# Patient Record
Sex: MALE | Race: WHITE | HISPANIC OR LATINO | URBAN - METROPOLITAN AREA
[De-identification: names, ages, dates, MRNs, and addresses within clinical notes are randomized per-mention and may not be internally consistent; named-entity substitution may affect disease eponyms.]

---

## 2022-07-30 ENCOUNTER — APPOINTMENT (OUTPATIENT)
Dept: CT IMAGING | Facility: HOSPITAL | Age: 27
End: 2022-07-30

## 2022-07-30 ENCOUNTER — APPOINTMENT (OUTPATIENT)
Dept: GENERAL RADIOLOGY | Facility: HOSPITAL | Age: 27
End: 2022-07-30

## 2022-07-30 ENCOUNTER — HOSPITAL ENCOUNTER (INPATIENT)
Facility: HOSPITAL | Age: 27
LOS: 6 days | Discharge: HOME | End: 2022-08-05
Attending: EMERGENCY MEDICINE

## 2022-07-30 DIAGNOSIS — T40.604A: Primary | ICD-10-CM

## 2022-07-30 DIAGNOSIS — J96.00 ACUTE RESPIRATORY FAILURE, UNSPECIFIED WHETHER WITH HYPOXIA OR HYPERCAPNIA: ICD-10-CM

## 2022-07-30 PROBLEM — T40.601A NARCOTIC OVERDOSE: Status: ACTIVE | Noted: 2022-07-30

## 2022-07-30 LAB
ALBUMIN SERPL-MCNC: 4.4 G/DL (ref 3.5–5.2)
ALBUMIN/GLOB SERPL: 1.7 G/DL
ALP SERPL-CCNC: 74 U/L (ref 39–117)
ALT SERPL W P-5'-P-CCNC: 18 U/L (ref 1–41)
AMPHET+METHAMPHET UR QL: POSITIVE
ANION GAP SERPL CALCULATED.3IONS-SCNC: 14 MMOL/L (ref 5–15)
APAP SERPL-MCNC: 99 MCG/ML (ref 0–30)
ARTERIAL PATENCY WRIST A: POSITIVE
AST SERPL-CCNC: 28 U/L (ref 1–40)
ATMOSPHERIC PRESS: 754.1 MMHG
BACTERIA UR QL AUTO: NORMAL /HPF
BARBITURATES UR QL SCN: NEGATIVE
BASE EXCESS BLDA CALC-SCNC: -1.6 MMOL/L (ref 0–2)
BASOPHILS # BLD AUTO: 0.03 10*3/MM3 (ref 0–0.2)
BASOPHILS NFR BLD AUTO: 0.3 % (ref 0–1.5)
BDY SITE: ABNORMAL
BENZODIAZ UR QL SCN: POSITIVE
BILIRUB SERPL-MCNC: 0.7 MG/DL (ref 0–1.2)
BILIRUB UR QL STRIP: NEGATIVE
BUN SERPL-MCNC: 14 MG/DL (ref 6–20)
BUN/CREAT SERPL: 13.7 (ref 7–25)
CALCIUM SPEC-SCNC: 8.5 MG/DL (ref 8.6–10.5)
CANNABINOIDS SERPL QL: NEGATIVE
CHLORIDE SERPL-SCNC: 103 MMOL/L (ref 98–107)
CLARITY UR: ABNORMAL
CO2 SERPL-SCNC: 20 MMOL/L (ref 22–29)
COCAINE UR QL: NEGATIVE
COLOR UR: ABNORMAL
CREAT SERPL-MCNC: 1.02 MG/DL (ref 0.76–1.27)
DEPRECATED RDW RBC AUTO: 41.5 FL (ref 37–54)
EGFRCR SERPLBLD CKD-EPI 2021: 102.7 ML/MIN/1.73
EOSINOPHIL # BLD AUTO: 0.02 10*3/MM3 (ref 0–0.4)
EOSINOPHIL NFR BLD AUTO: 0.2 % (ref 0.3–6.2)
ERYTHROCYTE [DISTWIDTH] IN BLOOD BY AUTOMATED COUNT: 13.7 % (ref 12.3–15.4)
ETHANOL BLD-MCNC: <10 MG/DL (ref 0–10)
ETHANOL UR QL: <0.01 %
GLOBULIN UR ELPH-MCNC: 2.6 GM/DL
GLUCOSE BLDC GLUCOMTR-MCNC: 190 MG/DL (ref 70–130)
GLUCOSE BLDC GLUCOMTR-MCNC: 94 MG/DL (ref 70–130)
GLUCOSE SERPL-MCNC: 158 MG/DL (ref 65–99)
GLUCOSE UR STRIP-MCNC: NEGATIVE MG/DL
HCO3 BLDA-SCNC: 24.5 MMOL/L (ref 22–28)
HCT VFR BLD AUTO: 44.1 % (ref 37.5–51)
HGB BLD-MCNC: 15.1 G/DL (ref 13–17.7)
HGB UR QL STRIP.AUTO: ABNORMAL
HYALINE CASTS UR QL AUTO: NORMAL /LPF
IMM GRANULOCYTES # BLD AUTO: 0.04 10*3/MM3 (ref 0–0.05)
IMM GRANULOCYTES NFR BLD AUTO: 0.3 % (ref 0–0.5)
INHALED O2 CONCENTRATION: 50 %
KETONES UR QL STRIP: ABNORMAL
LEUKOCYTE ESTERASE UR QL STRIP.AUTO: NEGATIVE
LYMPHOCYTES # BLD AUTO: 1.03 10*3/MM3 (ref 0.7–3.1)
LYMPHOCYTES NFR BLD AUTO: 9 % (ref 19.6–45.3)
MCH RBC QN AUTO: 28.9 PG (ref 26.6–33)
MCHC RBC AUTO-ENTMCNC: 34.2 G/DL (ref 31.5–35.7)
MCV RBC AUTO: 84.5 FL (ref 79–97)
METHADONE UR QL SCN: NEGATIVE
MODALITY: ABNORMAL
MONOCYTES # BLD AUTO: 0.66 10*3/MM3 (ref 0.1–0.9)
MONOCYTES NFR BLD AUTO: 5.8 % (ref 5–12)
NEUTROPHILS NFR BLD AUTO: 84.4 % (ref 42.7–76)
NEUTROPHILS NFR BLD AUTO: 9.66 10*3/MM3 (ref 1.7–7)
NITRITE UR QL STRIP: NEGATIVE
NRBC BLD AUTO-RTO: 0 /100 WBC (ref 0–0.2)
O2 A-A PPRESDIFF RESPIRATORY: 0.5 MMHG
OPIATES UR QL: NEGATIVE
OXYCODONE UR QL SCN: POSITIVE
PCO2 BLDA: 45 MM HG (ref 35–45)
PEEP RESPIRATORY: 5 CM[H2O]
PH BLDA: 7.34 PH UNITS (ref 7.35–7.45)
PH UR STRIP.AUTO: 6 [PH] (ref 5–8)
PLATELET # BLD AUTO: 258 10*3/MM3 (ref 140–450)
PMV BLD AUTO: 10.2 FL (ref 6–12)
PO2 BLDA: 154.4 MM HG (ref 80–100)
POTASSIUM SERPL-SCNC: 3.4 MMOL/L (ref 3.5–5.2)
PROT SERPL-MCNC: 7 G/DL (ref 6–8.5)
PROT UR QL STRIP: ABNORMAL
RBC # BLD AUTO: 5.22 10*6/MM3 (ref 4.14–5.8)
RBC # UR STRIP: NORMAL /HPF
REF LAB TEST METHOD: NORMAL
SALICYLATES SERPL-MCNC: <0.3 MG/DL
SAO2 % BLDCOA: 99.2 % (ref 92–99)
SARS-COV-2 RNA RESP QL NAA+PROBE: NOT DETECTED
SET MECH RESP RATE: 14
SODIUM SERPL-SCNC: 137 MMOL/L (ref 136–145)
SP GR UR STRIP: 1.03 (ref 1–1.03)
SQUAMOUS #/AREA URNS HPF: NORMAL /HPF
TOTAL RATE: 16 BREATHS/MINUTE
UROBILINOGEN UR QL STRIP: ABNORMAL
VENTILATOR MODE: ABNORMAL
VT ON VENT VENT: 500 ML
WBC # UR STRIP: NORMAL /HPF
WBC NRBC COR # BLD: 11.44 10*3/MM3 (ref 3.4–10.8)

## 2022-07-30 PROCEDURE — 93010 ELECTROCARDIOGRAM REPORT: CPT | Performed by: INTERNAL MEDICINE

## 2022-07-30 PROCEDURE — 80307 DRUG TEST PRSMV CHEM ANLYZR: CPT | Performed by: EMERGENCY MEDICINE

## 2022-07-30 PROCEDURE — 81001 URINALYSIS AUTO W/SCOPE: CPT | Performed by: EMERGENCY MEDICINE

## 2022-07-30 PROCEDURE — 94799 UNLISTED PULMONARY SVC/PX: CPT

## 2022-07-30 PROCEDURE — 82803 BLOOD GASES ANY COMBINATION: CPT

## 2022-07-30 PROCEDURE — 80179 DRUG ASSAY SALICYLATE: CPT | Performed by: EMERGENCY MEDICINE

## 2022-07-30 PROCEDURE — 94760 N-INVAS EAR/PLS OXIMETRY 1: CPT

## 2022-07-30 PROCEDURE — 71045 X-RAY EXAM CHEST 1 VIEW: CPT

## 2022-07-30 PROCEDURE — 93005 ELECTROCARDIOGRAM TRACING: CPT | Performed by: EMERGENCY MEDICINE

## 2022-07-30 PROCEDURE — 0 ACETYLCYSTEINE PER 100 MG: Performed by: EMERGENCY MEDICINE

## 2022-07-30 PROCEDURE — 63710000001 INSULIN REGULAR HUMAN PER 5 UNITS: Performed by: INTERNAL MEDICINE

## 2022-07-30 PROCEDURE — U0003 INFECTIOUS AGENT DETECTION BY NUCLEIC ACID (DNA OR RNA); SEVERE ACUTE RESPIRATORY SYNDROME CORONAVIRUS 2 (SARS-COV-2) (CORONAVIRUS DISEASE [COVID-19]), AMPLIFIED PROBE TECHNIQUE, MAKING USE OF HIGH THROUGHPUT TECHNOLOGIES AS DESCRIBED BY CMS-2020-01-R: HCPCS | Performed by: EMERGENCY MEDICINE

## 2022-07-30 PROCEDURE — 80143 DRUG ASSAY ACETAMINOPHEN: CPT | Performed by: EMERGENCY MEDICINE

## 2022-07-30 PROCEDURE — 94761 N-INVAS EAR/PLS OXIMETRY MLT: CPT

## 2022-07-30 PROCEDURE — 85025 COMPLETE CBC W/AUTO DIFF WBC: CPT | Performed by: EMERGENCY MEDICINE

## 2022-07-30 PROCEDURE — 31500 INSERT EMERGENCY AIRWAY: CPT

## 2022-07-30 PROCEDURE — 25010000002 MIDAZOLAM 50 MG/10ML SOLUTION: Performed by: EMERGENCY MEDICINE

## 2022-07-30 PROCEDURE — 80053 COMPREHEN METABOLIC PANEL: CPT | Performed by: EMERGENCY MEDICINE

## 2022-07-30 PROCEDURE — 99291 CRITICAL CARE FIRST HOUR: CPT

## 2022-07-30 PROCEDURE — 36600 WITHDRAWAL OF ARTERIAL BLOOD: CPT

## 2022-07-30 PROCEDURE — 25010000002 MIDAZOLAM PER 1 MG: Performed by: EMERGENCY MEDICINE

## 2022-07-30 PROCEDURE — 25010000002 SUCCINYLCHOLINE PER 20 MG: Performed by: EMERGENCY MEDICINE

## 2022-07-30 PROCEDURE — 82962 GLUCOSE BLOOD TEST: CPT

## 2022-07-30 PROCEDURE — 25010000002 PROPOFOL 10 MG/ML EMULSION: Performed by: EMERGENCY MEDICINE

## 2022-07-30 PROCEDURE — 70450 CT HEAD/BRAIN W/O DYE: CPT

## 2022-07-30 PROCEDURE — 82077 ASSAY SPEC XCP UR&BREATH IA: CPT | Performed by: EMERGENCY MEDICINE

## 2022-07-30 PROCEDURE — 94002 VENT MGMT INPAT INIT DAY: CPT

## 2022-07-30 RX ORDER — ETOMIDATE 2 MG/ML
20 INJECTION INTRAVENOUS ONCE
Status: COMPLETED | OUTPATIENT
Start: 2022-07-30 | End: 2022-07-30

## 2022-07-30 RX ORDER — SODIUM CHLORIDE 9 MG/ML
50 INJECTION, SOLUTION INTRAVENOUS CONTINUOUS
Status: DISCONTINUED | OUTPATIENT
Start: 2022-07-30 | End: 2022-08-03

## 2022-07-30 RX ORDER — FENTANYL CITRATE 50 UG/ML
50 INJECTION, SOLUTION INTRAMUSCULAR; INTRAVENOUS
Status: DISCONTINUED | OUTPATIENT
Start: 2022-07-30 | End: 2022-07-31

## 2022-07-30 RX ORDER — DEXTROSE MONOHYDRATE 25 G/50ML
25 INJECTION, SOLUTION INTRAVENOUS
Status: DISCONTINUED | OUTPATIENT
Start: 2022-07-30 | End: 2022-08-05 | Stop reason: HOSPADM

## 2022-07-30 RX ORDER — MAGNESIUM CARB/ALUMINUM HYDROX 105-160MG
296 TABLET,CHEWABLE ORAL ONCE
Status: COMPLETED | OUTPATIENT
Start: 2022-07-30 | End: 2022-07-30

## 2022-07-30 RX ORDER — SODIUM CHLORIDE 0.9 % (FLUSH) 0.9 %
10 SYRINGE (ML) INJECTION EVERY 12 HOURS SCHEDULED
Status: DISCONTINUED | OUTPATIENT
Start: 2022-07-30 | End: 2022-08-05 | Stop reason: HOSPADM

## 2022-07-30 RX ORDER — NICOTINE POLACRILEX 4 MG
15 LOZENGE BUCCAL
Status: DISCONTINUED | OUTPATIENT
Start: 2022-07-30 | End: 2022-08-05 | Stop reason: HOSPADM

## 2022-07-30 RX ORDER — POTASSIUM CHLORIDE 750 MG/1
40 TABLET, FILM COATED, EXTENDED RELEASE ORAL AS NEEDED
Status: DISCONTINUED | OUTPATIENT
Start: 2022-07-30 | End: 2022-07-31 | Stop reason: SDUPTHER

## 2022-07-30 RX ORDER — POTASSIUM CHLORIDE 1.5 G/1.77G
40 POWDER, FOR SOLUTION ORAL AS NEEDED
Status: DISCONTINUED | OUTPATIENT
Start: 2022-07-30 | End: 2022-07-31 | Stop reason: SDUPTHER

## 2022-07-30 RX ORDER — MIDAZOLAM HYDROCHLORIDE 1 MG/ML
3 INJECTION INTRAMUSCULAR; INTRAVENOUS ONCE
Status: COMPLETED | OUTPATIENT
Start: 2022-07-30 | End: 2022-07-30

## 2022-07-30 RX ORDER — SUCCINYLCHOLINE CHLORIDE 20 MG/ML
100 INJECTION INTRAMUSCULAR; INTRAVENOUS ONCE
Status: COMPLETED | OUTPATIENT
Start: 2022-07-30 | End: 2022-07-30

## 2022-07-30 RX ORDER — SODIUM CHLORIDE 0.9 % (FLUSH) 0.9 %
10 SYRINGE (ML) INJECTION AS NEEDED
Status: DISCONTINUED | OUTPATIENT
Start: 2022-07-30 | End: 2022-08-05 | Stop reason: HOSPADM

## 2022-07-30 RX ORDER — POTASSIUM CHLORIDE 7.45 MG/ML
10 INJECTION INTRAVENOUS
Status: DISCONTINUED | OUTPATIENT
Start: 2022-07-30 | End: 2022-07-31 | Stop reason: SDUPTHER

## 2022-07-30 RX ADMIN — PROPOFOL 50 MCG/KG/MIN: 10 INJECTION, EMULSION INTRAVENOUS at 20:49

## 2022-07-30 RX ADMIN — Medication 296 ML: at 18:44

## 2022-07-30 RX ADMIN — SUCCINYLCHOLINE CHLORIDE 100 MG: 20 INJECTION, SOLUTION INTRAMUSCULAR; INTRAVENOUS; PARENTERAL at 17:36

## 2022-07-30 RX ADMIN — Medication 10 ML: at 20:58

## 2022-07-30 RX ADMIN — ACETYLCYSTEINE 11920 MG: 6 INJECTION, SOLUTION INTRAVENOUS at 18:29

## 2022-07-30 RX ADMIN — MIDAZOLAM 5 MG/HR: 5 INJECTION INTRAMUSCULAR; INTRAVENOUS at 18:00

## 2022-07-30 RX ADMIN — ACETYLCYSTEINE 7940 MG: 6 INJECTION, SOLUTION INTRAVENOUS at 23:51

## 2022-07-30 RX ADMIN — ETOMIDATE 40 MG: 2 INJECTION, SOLUTION INTRAVENOUS at 17:36

## 2022-07-30 RX ADMIN — ACETYLCYSTEINE 3980 MG: 6 INJECTION, SOLUTION INTRAVENOUS at 19:33

## 2022-07-30 RX ADMIN — PROPOFOL 50 MCG/KG/MIN: 10 INJECTION, EMULSION INTRAVENOUS at 17:40

## 2022-07-30 RX ADMIN — INSULIN HUMAN 3 UNITS: 100 INJECTION, SOLUTION PARENTERAL at 20:57

## 2022-07-30 RX ADMIN — SODIUM CHLORIDE 125 ML/HR: 9 INJECTION, SOLUTION INTRAVENOUS at 20:57

## 2022-07-30 RX ADMIN — ACTIVATED CHARCOAL 50 G: 208 SUSPENSION ORAL at 18:20

## 2022-07-30 RX ADMIN — MIDAZOLAM 3 MG: 1 INJECTION INTRAMUSCULAR; INTRAVENOUS at 17:50

## 2022-07-31 ENCOUNTER — APPOINTMENT (OUTPATIENT)
Dept: CT IMAGING | Facility: HOSPITAL | Age: 27
End: 2022-07-31

## 2022-07-31 LAB
ALBUMIN SERPL-MCNC: 3.5 G/DL (ref 3.5–5.2)
ALBUMIN SERPL-MCNC: 3.7 G/DL (ref 3.5–5.2)
ALBUMIN/GLOB SERPL: 1.8 G/DL
ALP SERPL-CCNC: 63 U/L (ref 39–117)
ALP SERPL-CCNC: 66 U/L (ref 39–117)
ALT SERPL W P-5'-P-CCNC: 15 U/L (ref 1–41)
ALT SERPL W P-5'-P-CCNC: 17 U/L (ref 1–41)
ANION GAP SERPL CALCULATED.3IONS-SCNC: 12 MMOL/L (ref 5–15)
ANION GAP SERPL CALCULATED.3IONS-SCNC: 8.2 MMOL/L (ref 5–15)
APAP SERPL-MCNC: <5 MCG/ML (ref 0–30)
AST SERPL-CCNC: 17 U/L (ref 1–40)
AST SERPL-CCNC: 20 U/L (ref 1–40)
BASOPHILS # BLD AUTO: 0.03 10*3/MM3 (ref 0–0.2)
BASOPHILS NFR BLD AUTO: 0.3 % (ref 0–1.5)
BILIRUB CONJ SERPL-MCNC: <0.2 MG/DL (ref 0–0.3)
BILIRUB INDIRECT SERPL-MCNC: ABNORMAL MG/DL
BILIRUB SERPL-MCNC: 0.7 MG/DL (ref 0–1.2)
BILIRUB SERPL-MCNC: 0.8 MG/DL (ref 0–1.2)
BUN SERPL-MCNC: 10 MG/DL (ref 6–20)
BUN SERPL-MCNC: 6 MG/DL (ref 6–20)
BUN/CREAT SERPL: 14.1 (ref 7–25)
BUN/CREAT SERPL: 9.1 (ref 7–25)
CALCIUM SPEC-SCNC: 8.2 MG/DL (ref 8.6–10.5)
CALCIUM SPEC-SCNC: 8.3 MG/DL (ref 8.6–10.5)
CHLORIDE SERPL-SCNC: 106 MMOL/L (ref 98–107)
CHLORIDE SERPL-SCNC: 109 MMOL/L (ref 98–107)
CO2 SERPL-SCNC: 23.8 MMOL/L (ref 22–29)
CO2 SERPL-SCNC: 24 MMOL/L (ref 22–29)
CREAT SERPL-MCNC: 0.66 MG/DL (ref 0.76–1.27)
CREAT SERPL-MCNC: 0.71 MG/DL (ref 0.76–1.27)
DEPRECATED RDW RBC AUTO: 43.3 FL (ref 37–54)
EGFRCR SERPLBLD CKD-EPI 2021: 128.2 ML/MIN/1.73
EGFRCR SERPLBLD CKD-EPI 2021: 131 ML/MIN/1.73
EOSINOPHIL # BLD AUTO: 0.18 10*3/MM3 (ref 0–0.4)
EOSINOPHIL NFR BLD AUTO: 1.9 % (ref 0.3–6.2)
ERYTHROCYTE [DISTWIDTH] IN BLOOD BY AUTOMATED COUNT: 13.9 % (ref 12.3–15.4)
GLOBULIN UR ELPH-MCNC: 2.1 GM/DL
GLUCOSE BLDC GLUCOMTR-MCNC: 111 MG/DL (ref 70–130)
GLUCOSE BLDC GLUCOMTR-MCNC: 113 MG/DL (ref 70–130)
GLUCOSE BLDC GLUCOMTR-MCNC: 95 MG/DL (ref 70–130)
GLUCOSE SERPL-MCNC: 113 MG/DL (ref 65–99)
GLUCOSE SERPL-MCNC: 98 MG/DL (ref 65–99)
HCT VFR BLD AUTO: 46.7 % (ref 37.5–51)
HGB BLD-MCNC: 15.7 G/DL (ref 13–17.7)
IMM GRANULOCYTES # BLD AUTO: 0.02 10*3/MM3 (ref 0–0.05)
IMM GRANULOCYTES NFR BLD AUTO: 0.2 % (ref 0–0.5)
INR PPP: 1.32 (ref 0.9–1.1)
LYMPHOCYTES # BLD AUTO: 2.24 10*3/MM3 (ref 0.7–3.1)
LYMPHOCYTES NFR BLD AUTO: 23.6 % (ref 19.6–45.3)
MAGNESIUM SERPL-MCNC: 2.1 MG/DL (ref 1.6–2.6)
MCH RBC QN AUTO: 28.8 PG (ref 26.6–33)
MCHC RBC AUTO-ENTMCNC: 33.6 G/DL (ref 31.5–35.7)
MCV RBC AUTO: 85.7 FL (ref 79–97)
MONOCYTES # BLD AUTO: 0.71 10*3/MM3 (ref 0.1–0.9)
MONOCYTES NFR BLD AUTO: 7.5 % (ref 5–12)
NEUTROPHILS NFR BLD AUTO: 6.3 10*3/MM3 (ref 1.7–7)
NEUTROPHILS NFR BLD AUTO: 66.5 % (ref 42.7–76)
NRBC BLD AUTO-RTO: 0 /100 WBC (ref 0–0.2)
PLATELET # BLD AUTO: 243 10*3/MM3 (ref 140–450)
PMV BLD AUTO: 10.2 FL (ref 6–12)
POTASSIUM SERPL-SCNC: 2.8 MMOL/L (ref 3.5–5.2)
POTASSIUM SERPL-SCNC: 3.8 MMOL/L (ref 3.5–5.2)
PROT SERPL-MCNC: 5.8 G/DL (ref 6–8.5)
PROT SERPL-MCNC: 5.8 G/DL (ref 6–8.5)
PROTHROMBIN TIME: 16.2 SECONDS (ref 11.7–14.2)
RBC # BLD AUTO: 5.45 10*6/MM3 (ref 4.14–5.8)
SODIUM SERPL-SCNC: 141 MMOL/L (ref 136–145)
SODIUM SERPL-SCNC: 142 MMOL/L (ref 136–145)
WBC NRBC COR # BLD: 9.48 10*3/MM3 (ref 3.4–10.8)

## 2022-07-31 PROCEDURE — 0 POTASSIUM CHLORIDE 10 MEQ/100ML SOLUTION: Performed by: INTERNAL MEDICINE

## 2022-07-31 PROCEDURE — 82248 BILIRUBIN DIRECT: CPT | Performed by: INTERNAL MEDICINE

## 2022-07-31 PROCEDURE — 94799 UNLISTED PULMONARY SVC/PX: CPT

## 2022-07-31 PROCEDURE — 80053 COMPREHEN METABOLIC PANEL: CPT | Performed by: INTERNAL MEDICINE

## 2022-07-31 PROCEDURE — 25010000002 FENTANYL CITRATE (PF) 50 MCG/ML SOLUTION: Performed by: INTERNAL MEDICINE

## 2022-07-31 PROCEDURE — 25010000002 MIDAZOLAM 50 MG/10ML SOLUTION: Performed by: EMERGENCY MEDICINE

## 2022-07-31 PROCEDURE — 83735 ASSAY OF MAGNESIUM: CPT | Performed by: INTERNAL MEDICINE

## 2022-07-31 PROCEDURE — 94760 N-INVAS EAR/PLS OXIMETRY 1: CPT

## 2022-07-31 PROCEDURE — 25010000002 ENOXAPARIN PER 10 MG: Performed by: INTERNAL MEDICINE

## 2022-07-31 PROCEDURE — 85025 COMPLETE CBC W/AUTO DIFF WBC: CPT | Performed by: INTERNAL MEDICINE

## 2022-07-31 PROCEDURE — 25010000002 PROPOFOL 10 MG/ML EMULSION: Performed by: EMERGENCY MEDICINE

## 2022-07-31 PROCEDURE — 85610 PROTHROMBIN TIME: CPT | Performed by: INTERNAL MEDICINE

## 2022-07-31 PROCEDURE — 94003 VENT MGMT INPAT SUBQ DAY: CPT

## 2022-07-31 PROCEDURE — 94761 N-INVAS EAR/PLS OXIMETRY MLT: CPT

## 2022-07-31 PROCEDURE — 82962 GLUCOSE BLOOD TEST: CPT

## 2022-07-31 PROCEDURE — 80143 DRUG ASSAY ACETAMINOPHEN: CPT | Performed by: INTERNAL MEDICINE

## 2022-07-31 RX ORDER — MAGNESIUM SULFATE HEPTAHYDRATE 40 MG/ML
4 INJECTION, SOLUTION INTRAVENOUS AS NEEDED
Status: DISCONTINUED | OUTPATIENT
Start: 2022-07-31 | End: 2022-08-05 | Stop reason: HOSPADM

## 2022-07-31 RX ORDER — HYDROMORPHONE HYDROCHLORIDE 2 MG/1
2 TABLET ORAL EVERY 8 HOURS
Status: DISCONTINUED | OUTPATIENT
Start: 2022-07-31 | End: 2022-07-31

## 2022-07-31 RX ORDER — POTASSIUM CHLORIDE 7.45 MG/ML
10 INJECTION INTRAVENOUS
Status: DISCONTINUED | OUTPATIENT
Start: 2022-07-31 | End: 2022-08-05 | Stop reason: HOSPADM

## 2022-07-31 RX ORDER — HYDROMORPHONE HYDROCHLORIDE 2 MG/1
2 TABLET ORAL EVERY 8 HOURS
Status: DISCONTINUED | OUTPATIENT
Start: 2022-07-31 | End: 2022-08-02

## 2022-07-31 RX ORDER — MAGNESIUM SULFATE HEPTAHYDRATE 40 MG/ML
2 INJECTION, SOLUTION INTRAVENOUS AS NEEDED
Status: DISCONTINUED | OUTPATIENT
Start: 2022-07-31 | End: 2022-08-05 | Stop reason: HOSPADM

## 2022-07-31 RX ORDER — POTASSIUM CHLORIDE 750 MG/1
40 TABLET, FILM COATED, EXTENDED RELEASE ORAL AS NEEDED
Status: DISCONTINUED | OUTPATIENT
Start: 2022-07-31 | End: 2022-08-05 | Stop reason: HOSPADM

## 2022-07-31 RX ORDER — FAMOTIDINE 20 MG/1
20 TABLET, FILM COATED ORAL
Status: DISCONTINUED | OUTPATIENT
Start: 2022-07-31 | End: 2022-08-01

## 2022-07-31 RX ORDER — DIAZEPAM 5 MG/1
10 TABLET ORAL EVERY 12 HOURS
Status: DISCONTINUED | OUTPATIENT
Start: 2022-07-31 | End: 2022-07-31

## 2022-07-31 RX ORDER — ENOXAPARIN SODIUM 100 MG/ML
40 INJECTION SUBCUTANEOUS NIGHTLY
Status: DISCONTINUED | OUTPATIENT
Start: 2022-07-31 | End: 2022-08-03

## 2022-07-31 RX ORDER — FENTANYL CITRATE 50 UG/ML
100 INJECTION, SOLUTION INTRAMUSCULAR; INTRAVENOUS
Status: DISCONTINUED | OUTPATIENT
Start: 2022-07-31 | End: 2022-08-02

## 2022-07-31 RX ORDER — POTASSIUM CHLORIDE 1.5 G/1.77G
40 POWDER, FOR SOLUTION ORAL AS NEEDED
Status: DISCONTINUED | OUTPATIENT
Start: 2022-07-31 | End: 2022-08-05 | Stop reason: HOSPADM

## 2022-07-31 RX ORDER — DIAZEPAM 5 MG/1
10 TABLET ORAL EVERY 12 HOURS
Status: DISCONTINUED | OUTPATIENT
Start: 2022-07-31 | End: 2022-08-02

## 2022-07-31 RX ADMIN — MIDAZOLAM 8 MG/HR: 5 INJECTION INTRAMUSCULAR; INTRAVENOUS at 20:47

## 2022-07-31 RX ADMIN — PROPOFOL 50 MCG/KG/MIN: 10 INJECTION, EMULSION INTRAVENOUS at 09:07

## 2022-07-31 RX ADMIN — MIDAZOLAM 10 MG/HR: 5 INJECTION INTRAMUSCULAR; INTRAVENOUS at 15:16

## 2022-07-31 RX ADMIN — POTASSIUM CHLORIDE 10 MEQ: 7.46 INJECTION, SOLUTION INTRAVENOUS at 08:33

## 2022-07-31 RX ADMIN — POTASSIUM CHLORIDE 10 MEQ: 7.46 INJECTION, SOLUTION INTRAVENOUS at 09:51

## 2022-07-31 RX ADMIN — MIDAZOLAM 10 MG/HR: 5 INJECTION INTRAMUSCULAR; INTRAVENOUS at 09:12

## 2022-07-31 RX ADMIN — HYDROMORPHONE HYDROCHLORIDE 2 MG: 2 TABLET ORAL at 10:31

## 2022-07-31 RX ADMIN — Medication 10 ML: at 08:33

## 2022-07-31 RX ADMIN — PROPOFOL 20 MCG/KG/MIN: 10 INJECTION, EMULSION INTRAVENOUS at 13:35

## 2022-07-31 RX ADMIN — POTASSIUM CHLORIDE 10 MEQ: 7.46 INJECTION, SOLUTION INTRAVENOUS at 16:43

## 2022-07-31 RX ADMIN — MIDAZOLAM 3 MG/HR: 5 INJECTION INTRAMUSCULAR; INTRAVENOUS at 00:35

## 2022-07-31 RX ADMIN — PROPOFOL 50 MCG/KG/MIN: 10 INJECTION, EMULSION INTRAVENOUS at 00:56

## 2022-07-31 RX ADMIN — POTASSIUM CHLORIDE 10 MEQ: 7.46 INJECTION, SOLUTION INTRAVENOUS at 07:12

## 2022-07-31 RX ADMIN — PROPOFOL 50 MCG/KG/MIN: 10 INJECTION, EMULSION INTRAVENOUS at 04:56

## 2022-07-31 RX ADMIN — SODIUM CHLORIDE 125 ML/HR: 9 INJECTION, SOLUTION INTRAVENOUS at 04:58

## 2022-07-31 RX ADMIN — FAMOTIDINE 20 MG: 20 TABLET ORAL at 18:21

## 2022-07-31 RX ADMIN — FENTANYL CITRATE 100 MCG: 50 INJECTION INTRAMUSCULAR; INTRAVENOUS at 08:57

## 2022-07-31 RX ADMIN — DIAZEPAM 10 MG: 5 TABLET ORAL at 10:31

## 2022-07-31 RX ADMIN — ENOXAPARIN SODIUM 40 MG: 100 INJECTION SUBCUTANEOUS at 21:01

## 2022-07-31 RX ADMIN — FENTANYL CITRATE 100 MCG: 50 INJECTION INTRAMUSCULAR; INTRAVENOUS at 22:11

## 2022-07-31 RX ADMIN — FENTANYL CITRATE 100 MCG: 50 INJECTION INTRAMUSCULAR; INTRAVENOUS at 15:58

## 2022-07-31 RX ADMIN — PROPOFOL 20 MCG/KG/MIN: 10 INJECTION, EMULSION INTRAVENOUS at 21:01

## 2022-07-31 RX ADMIN — DIAZEPAM 10 MG: 5 TABLET ORAL at 21:44

## 2022-07-31 RX ADMIN — POTASSIUM CHLORIDE 10 MEQ: 7.46 INJECTION, SOLUTION INTRAVENOUS at 12:04

## 2022-07-31 RX ADMIN — HYDROMORPHONE HYDROCHLORIDE 2 MG: 2 TABLET ORAL at 18:21

## 2022-07-31 RX ADMIN — SODIUM CHLORIDE 125 ML/HR: 9 INJECTION, SOLUTION INTRAVENOUS at 20:46

## 2022-07-31 RX ADMIN — POTASSIUM CHLORIDE 10 MEQ: 7.46 INJECTION, SOLUTION INTRAVENOUS at 15:20

## 2022-08-01 ENCOUNTER — APPOINTMENT (OUTPATIENT)
Dept: GENERAL RADIOLOGY | Facility: HOSPITAL | Age: 27
End: 2022-08-01

## 2022-08-01 LAB
ALBUMIN SERPL-MCNC: 3.5 G/DL (ref 3.5–5.2)
ALP SERPL-CCNC: 67 U/L (ref 39–117)
ALT SERPL W P-5'-P-CCNC: 15 U/L (ref 1–41)
ANION GAP SERPL CALCULATED.3IONS-SCNC: 10 MMOL/L (ref 5–15)
AST SERPL-CCNC: 16 U/L (ref 1–40)
BASOPHILS # BLD AUTO: 0.04 10*3/MM3 (ref 0–0.2)
BASOPHILS NFR BLD AUTO: 0.4 % (ref 0–1.5)
BILIRUB CONJ SERPL-MCNC: <0.2 MG/DL (ref 0–0.3)
BILIRUB INDIRECT SERPL-MCNC: NORMAL MG/DL
BILIRUB SERPL-MCNC: 0.6 MG/DL (ref 0–1.2)
BUN SERPL-MCNC: 5 MG/DL (ref 6–20)
BUN/CREAT SERPL: 7.6 (ref 7–25)
CALCIUM SPEC-SCNC: 8.3 MG/DL (ref 8.6–10.5)
CHLORIDE SERPL-SCNC: 107 MMOL/L (ref 98–107)
CO2 SERPL-SCNC: 23 MMOL/L (ref 22–29)
CREAT SERPL-MCNC: 0.66 MG/DL (ref 0.76–1.27)
DEPRECATED RDW RBC AUTO: 41.8 FL (ref 37–54)
EGFRCR SERPLBLD CKD-EPI 2021: 131 ML/MIN/1.73
EOSINOPHIL # BLD AUTO: 0.25 10*3/MM3 (ref 0–0.4)
EOSINOPHIL NFR BLD AUTO: 2.7 % (ref 0.3–6.2)
ERYTHROCYTE [DISTWIDTH] IN BLOOD BY AUTOMATED COUNT: 13.6 % (ref 12.3–15.4)
GLUCOSE BLDC GLUCOMTR-MCNC: 86 MG/DL (ref 70–130)
GLUCOSE BLDC GLUCOMTR-MCNC: 89 MG/DL (ref 70–130)
GLUCOSE BLDC GLUCOMTR-MCNC: 97 MG/DL (ref 70–130)
GLUCOSE SERPL-MCNC: 98 MG/DL (ref 65–99)
HCT VFR BLD AUTO: 43.7 % (ref 37.5–51)
HGB BLD-MCNC: 14.8 G/DL (ref 13–17.7)
IMM GRANULOCYTES # BLD AUTO: 0.03 10*3/MM3 (ref 0–0.05)
IMM GRANULOCYTES NFR BLD AUTO: 0.3 % (ref 0–0.5)
LYMPHOCYTES # BLD AUTO: 1.53 10*3/MM3 (ref 0.7–3.1)
LYMPHOCYTES NFR BLD AUTO: 16.3 % (ref 19.6–45.3)
MAGNESIUM SERPL-MCNC: 1.9 MG/DL (ref 1.6–2.6)
MCH RBC QN AUTO: 28.5 PG (ref 26.6–33)
MCHC RBC AUTO-ENTMCNC: 33.9 G/DL (ref 31.5–35.7)
MCV RBC AUTO: 84 FL (ref 79–97)
MONOCYTES # BLD AUTO: 0.6 10*3/MM3 (ref 0.1–0.9)
MONOCYTES NFR BLD AUTO: 6.4 % (ref 5–12)
NEUTROPHILS NFR BLD AUTO: 6.91 10*3/MM3 (ref 1.7–7)
NEUTROPHILS NFR BLD AUTO: 73.9 % (ref 42.7–76)
NRBC BLD AUTO-RTO: 0 /100 WBC (ref 0–0.2)
PLATELET # BLD AUTO: 227 10*3/MM3 (ref 140–450)
PMV BLD AUTO: 10.4 FL (ref 6–12)
POTASSIUM SERPL-SCNC: 3.4 MMOL/L (ref 3.5–5.2)
POTASSIUM SERPL-SCNC: 4.1 MMOL/L (ref 3.5–5.2)
PROT SERPL-MCNC: 6.1 G/DL (ref 6–8.5)
QT INTERVAL: 351 MS
RBC # BLD AUTO: 5.2 10*6/MM3 (ref 4.14–5.8)
SODIUM SERPL-SCNC: 140 MMOL/L (ref 136–145)
WBC NRBC COR # BLD: 9.36 10*3/MM3 (ref 3.4–10.8)

## 2022-08-01 PROCEDURE — 94799 UNLISTED PULMONARY SVC/PX: CPT

## 2022-08-01 PROCEDURE — 0 POTASSIUM CHLORIDE 10 MEQ/100ML SOLUTION: Performed by: INTERNAL MEDICINE

## 2022-08-01 PROCEDURE — 25010000002 FENTANYL CITRATE (PF) 50 MCG/ML SOLUTION: Performed by: INTERNAL MEDICINE

## 2022-08-01 PROCEDURE — 94003 VENT MGMT INPAT SUBQ DAY: CPT

## 2022-08-01 PROCEDURE — 25010000002 MIDAZOLAM 50 MG/10ML SOLUTION: Performed by: EMERGENCY MEDICINE

## 2022-08-01 PROCEDURE — 82962 GLUCOSE BLOOD TEST: CPT

## 2022-08-01 PROCEDURE — 74018 RADEX ABDOMEN 1 VIEW: CPT

## 2022-08-01 PROCEDURE — 85025 COMPLETE CBC W/AUTO DIFF WBC: CPT | Performed by: INTERNAL MEDICINE

## 2022-08-01 PROCEDURE — 25010000002 PROPOFOL 10 MG/ML EMULSION: Performed by: EMERGENCY MEDICINE

## 2022-08-01 PROCEDURE — 80048 BASIC METABOLIC PNL TOTAL CA: CPT | Performed by: INTERNAL MEDICINE

## 2022-08-01 PROCEDURE — 25010000002 MIDAZOLAM 50 MG/10ML SOLUTION: Performed by: INTERNAL MEDICINE

## 2022-08-01 PROCEDURE — 83735 ASSAY OF MAGNESIUM: CPT | Performed by: INTERNAL MEDICINE

## 2022-08-01 PROCEDURE — 94760 N-INVAS EAR/PLS OXIMETRY 1: CPT

## 2022-08-01 PROCEDURE — 25010000002 ENOXAPARIN PER 10 MG: Performed by: INTERNAL MEDICINE

## 2022-08-01 PROCEDURE — 84132 ASSAY OF SERUM POTASSIUM: CPT | Performed by: INTERNAL MEDICINE

## 2022-08-01 PROCEDURE — 80076 HEPATIC FUNCTION PANEL: CPT | Performed by: INTERNAL MEDICINE

## 2022-08-01 PROCEDURE — 94761 N-INVAS EAR/PLS OXIMETRY MLT: CPT

## 2022-08-01 RX ORDER — HYDRALAZINE HYDROCHLORIDE 20 MG/ML
20 INJECTION INTRAMUSCULAR; INTRAVENOUS EVERY 6 HOURS PRN
Status: DISCONTINUED | OUTPATIENT
Start: 2022-08-01 | End: 2022-08-04

## 2022-08-01 RX ORDER — FAMOTIDINE 20 MG/1
20 TABLET, FILM COATED ORAL
Status: DISCONTINUED | OUTPATIENT
Start: 2022-08-01 | End: 2022-08-03

## 2022-08-01 RX ADMIN — PROPOFOL 10 MCG/KG/MIN: 10 INJECTION, EMULSION INTRAVENOUS at 22:28

## 2022-08-01 RX ADMIN — SODIUM CHLORIDE 125 ML/HR: 9 INJECTION, SOLUTION INTRAVENOUS at 03:48

## 2022-08-01 RX ADMIN — POTASSIUM CHLORIDE 10 MEQ: 7.46 INJECTION, SOLUTION INTRAVENOUS at 07:04

## 2022-08-01 RX ADMIN — FENTANYL CITRATE 100 MCG: 50 INJECTION INTRAMUSCULAR; INTRAVENOUS at 05:36

## 2022-08-01 RX ADMIN — FENTANYL CITRATE 100 MCG: 50 INJECTION INTRAMUSCULAR; INTRAVENOUS at 08:50

## 2022-08-01 RX ADMIN — SODIUM CHLORIDE 125 ML/HR: 9 INJECTION, SOLUTION INTRAVENOUS at 17:04

## 2022-08-01 RX ADMIN — MIDAZOLAM 9 MG/HR: 5 INJECTION INTRAMUSCULAR; INTRAVENOUS at 08:36

## 2022-08-01 RX ADMIN — MIDAZOLAM 9 MG/HR: 5 INJECTION INTRAMUSCULAR; INTRAVENOUS at 02:25

## 2022-08-01 RX ADMIN — FENTANYL CITRATE 100 MCG: 50 INJECTION INTRAMUSCULAR; INTRAVENOUS at 19:08

## 2022-08-01 RX ADMIN — POTASSIUM CHLORIDE 10 MEQ: 7.46 INJECTION, SOLUTION INTRAVENOUS at 08:27

## 2022-08-01 RX ADMIN — FENTANYL CITRATE 100 MCG: 50 INJECTION INTRAMUSCULAR; INTRAVENOUS at 14:08

## 2022-08-01 RX ADMIN — FAMOTIDINE 20 MG: 20 TABLET ORAL at 17:19

## 2022-08-01 RX ADMIN — HYDROMORPHONE HYDROCHLORIDE 2 MG: 2 TABLET ORAL at 02:31

## 2022-08-01 RX ADMIN — Medication 10 ML: at 08:58

## 2022-08-01 RX ADMIN — POTASSIUM CHLORIDE 10 MEQ: 7.46 INJECTION, SOLUTION INTRAVENOUS at 05:37

## 2022-08-01 RX ADMIN — Medication 10 ML: at 21:19

## 2022-08-01 RX ADMIN — HYDROMORPHONE HYDROCHLORIDE 2 MG: 2 TABLET ORAL at 09:56

## 2022-08-01 RX ADMIN — MIDAZOLAM 5 MG/HR: 5 INJECTION INTRAMUSCULAR; INTRAVENOUS at 17:08

## 2022-08-01 RX ADMIN — PROPOFOL 20 MCG/KG/MIN: 10 INJECTION, EMULSION INTRAVENOUS at 07:06

## 2022-08-01 RX ADMIN — POTASSIUM CHLORIDE 10 MEQ: 7.46 INJECTION, SOLUTION INTRAVENOUS at 12:45

## 2022-08-01 RX ADMIN — HYDROMORPHONE HYDROCHLORIDE 2 MG: 2 TABLET ORAL at 18:33

## 2022-08-01 RX ADMIN — DIAZEPAM 10 MG: 5 TABLET ORAL at 21:30

## 2022-08-01 RX ADMIN — DIAZEPAM 10 MG: 5 TABLET ORAL at 09:56

## 2022-08-01 RX ADMIN — FAMOTIDINE 20 MG: 20 TABLET ORAL at 06:37

## 2022-08-01 RX ADMIN — ENOXAPARIN SODIUM 40 MG: 100 INJECTION SUBCUTANEOUS at 21:19

## 2022-08-01 RX ADMIN — FENTANYL CITRATE 100 MCG: 50 INJECTION INTRAMUSCULAR; INTRAVENOUS at 16:22

## 2022-08-01 NOTE — NURSING NOTE
Spoke with Matilde at [facility]. She stated that we will await morning's lab results to decide if another dose of acetylcysteine is needed.

## 2022-08-01 NOTE — PROGRESS NOTES
Nutrition Services    Patient Name:  Juventino Reyes Moreno  YOB: 1995  MRN: [id number]  Admit Date:  7/30/2022    Comment:  Nutrition Consult for TF assessment. Pt s/p Polysubstance abuse, acute respiratory failure.  Propofol 4.76  Recommend IsoSource 1.5 with goal at 55ml/hr and water [id number].   Will continue to follow for clinical course.     CLINICAL NUTRITION ASSESSMENT      Reason for Assessment Physician Consult, Tube Feeding Assessment      H&P      No past medical history on file.    No past surgical history on file.     Current Problems     27-year-old male presented to the emergency room with an overdose. Acute respiratory failure on vent.     Nutritional Risk Screening       MST SCORE 0   Risk Screening Criteria      Encounter Information        Nutrition/Diet History:    n/a   Food Preferences:   n/a   Supplements:   n/a   Typical Activity Pattern:      Factors Affecting Intake: altered mental status, altered respiratory status     Tests/Procedures: CT scan       Anthropometrics        Current Height  Current Weight  BMI kg/m2    Weight: 67.9 kg (149 lb 11.1 oz) (08/01/22 0400)  There is no height or weight on file to calculate BMI.       Admission Weight    Ideal Body Weight (IBW)    Usual Body Weight (UBW)        Trending Weight Hx     Weight Change              PTA: Wt Readings from Last 30 Encounters:   08/01/22 0400 67.9 kg (149 lb 11.1 oz)   07/31/22 0800 77.4 kg (170 lb 10.2 oz)   07/30/22 2045 77.4 kg (170 lb 10.2 oz)   07/30/22 1733 79.4 kg (175 lb)            Labs       Pertinent Labs    Results from last 7 days   Lab Units 08/01/22  0337 07/31/22  1636 07/31/22  1635 07/31/22  0318   SODIUM mmol/L 140  --  141 142   POTASSIUM mmol/L 3.4*  --  3.8 2.8*   CHLORIDE mmol/L 107  --  109* 106   CO2 mmol/L 23.0  --  23.8 24.0   BUN mg/dL 5*  --  6 10   CREATININE mg/dL 0.66*  --  0.66* 0.71*   CALCIUM mg/dL 8.3*  --  8.3* 8.2*   BILIRUBIN mg/dL 0.6 0.8 0.7  --    ALK PHOS U/L 67 63  66  --    ALT (SGPT) U/L 15 17 15  --    AST (SGOT) U/L 16 17 20  --    GLUCOSE mg/dL 98  --  113* 98     Results from last 7 days   Lab Units 08/01/22  0337 07/31/22  0318   MAGNESIUM mg/dL 1.9 2.1   HEMOGLOBIN g/dL 14.8 15.7   HEMATOCRIT % 43.7 46.7   WBC 10*3/mm3 9.36 9.48     Results from last 7 days   Lab Units 08/01/22  0337 07/31/22  1636 07/31/22  0318 07/30/22  1755   INR   --  1.32*  --   --    PLATELETS 10*3/mm3 227  --  243 258     COVID19   Date Value Ref Range Status   07/30/2022 Not Detected Not Detected - Ref. Range Final     No results found for: HGBA1C       Medications           Scheduled Medications diazePAM, 10 mg, Nasogastric, Q12H  enoxaparin, 40 mg, Subcutaneous, Nightly  famotidine, 20 mg, Nasogastric, BID AC  HYDROmorphone, 2 mg, Nasogastric, Q8H  insulin regular, 0-14 Units, Subcutaneous, Q6H  sodium chloride, 10 mL, Intravenous, Q12H       Infusions midazolam, 1-5 mg/hr, Last Rate: 9 mg/hr (08/01/22 0836)  propofol, 5-50 mcg/kg/min, Last Rate: 10 mcg/kg/min (08/01/22 1108)  sodium chloride, 125 mL/hr, Last Rate: 125 mL/hr (08/01/22 0348)       PRN Medications •  dextrose  •  dextrose  •  fentaNYL citrate (PF)  •  glucagon (human recombinant)  •  hydrALAZINE  •  magnesium sulfate **OR** magnesium sulfate **OR** magnesium sulfate  •  potassium & sodium phosphates **OR** potassium & sodium phosphates  •  potassium chloride **OR** potassium chloride **OR** potassium chloride  •  [COMPLETED] Insert peripheral IV **AND** sodium chloride  •  sodium chloride     Physical Findings        Physical Appearance ventilator support     NFPE Not applicable   --  Edema  no edema   Gastrointestinal non-distended    Tubes/Drains NG tube   Oral/Mouth Cavity missing teeth   Skin skin intact     Estimated/Assessed Needs       Energy Requirements    Height for Calculation      Weight for Calculation 67.9 kg   Method for Estimation  30 kcal/kg   EST Needs (kcal/day) 2037       Protein Requirements    Weight for  Calculation 67.9 kg   EST Protein Needs (g/kg) 1.2 gm/kg   EST Daily Needs (g/day) 81       Fluid Requirements     Estimated Needs (mL/day)        Fluid Deficit    Current Na Level (mEq/L)    Desired Na Level (mEq/L)    Estimated Fluid Deficit (L)     --  Current Nutrition Orders & Evaluation of Intake       Oral Nutrition     Food Allergies NKFA   Current PO Diet NPO Diet NPO Type: Strict NPO   Supplement n/a   PO Evaluation     Trending % PO Intake        --  Nutrition Diagnosis        Nutrition Dx Problem 1 Problem: Needs Alternative Route    Etiology: Medical Diagnosis    Signs/Symptoms: NPO    Comment:            Intervention Goal        Intervention Goal(s) Maintain nutrition status, Reduce/improve symptoms, Meet estimated needs, Disease management/therapy, Initiate TF/PN and Tolerate TF/PN at goal     Nutrition Intervention        RD Action Follow Tx Progress, Care plan reviewed and Recommend/ordered:      Nutrition Prescription        Diet Prescription npo   Supplement Prescription n/a   Prescription Ordered    -   Enteral Prescription:     Enteral Route NG    TF Delivery Method Continuous    Enteral Product Isosource 1.5    Modular     Propofol Rate/Kcal     TF Start Rate 20ml/hr    TF Goal Rate 55ml/hr    Free Water Flush     TF Provision at Goal:          Calories 1980(97% needs met)         Protein (gm) 89 (100% needs met)         Fluid (mL) 1003    Prescription Ordered Yes                                                               -  Monitor/Evaluation        Monitor Per protocol     RD to follow per protocol.      Electronically signed by:  Luz Morales RD  08/01/22 12:28 EDT

## 2022-08-01 NOTE — PLAN OF CARE
Problem: Restraint, Nonbehavioral (Nonviolent)  Goal: Absence of Harm or Injury  Intervention: Implement Least Restrictive Safety Strategies  Recent Flowsheet Documentation  Taken 8/1/2022 0600 by Radha Lewis RN  Medical Device Protection:   IV pole/bag removed from visual field   torso covered   tubing secured  Less Restrictive Alternative:   bed alarm in use   sensory stimulation provided   safety enhancements provided  De-Escalation Techniques:   appropriate behavior reinforced   increased round frequency   medication administered   quiet time facilitated   reoriented   stimulation decreased   time out facilitated  Diversional Activities: television  Taken 8/1/2022 0500 by Radha Lewis RN  Medical Device Protection:   IV pole/bag removed from visual field   torso covered   tubing secured  Taken 8/1/2022 0400 by Radha Lewis RN  Medical Device Protection:   IV pole/bag removed from visual field   torso covered   tubing secured  Less Restrictive Alternative:   bed alarm in use   sensory stimulation provided   safety enhancements provided  De-Escalation Techniques:   increased round frequency   medication administered   quiet time facilitated   reoriented   stimulation decreased  Diversional Activities: television  Taken 8/1/2022 0300 by Radha Lewis RN  Medical Device Protection:   IV pole/bag removed from visual field   torso covered   tubing secured  Taken 8/1/2022 0200 by Radha Lewis RN  Medical Device Protection:   IV pole/bag removed from visual field   tubing secured   torso covered  Less Restrictive Alternative:   bed alarm in use   sensory stimulation provided   safety enhancements provided  De-Escalation Techniques:   increased round frequency   medication administered   quiet time facilitated   stimulation decreased   reoriented  Diversional Activities: television  Taken 8/1/2022 0100 by Radha Lewis RN  Medical Device Protection:   IV pole/bag removed from visual field   torso covered    tubing secured  Taken 8/1/2022 0000 by Radha Lewis RN  Medical Device Protection:   IV pole/bag removed from visual field   torso covered   tubing secured  Less Restrictive Alternative:   bed alarm in use   sensory stimulation provided   safety enhancements provided  De-Escalation Techniques:   increased round frequency   medication administered   quiet time facilitated   stimulation decreased   reoriented  Diversional Activities: television  Taken 7/31/2022 2300 by Radha Lewis RN  Medical Device Protection:   IV pole/bag removed from visual field   torso covered   tubing secured  Taken 7/31/2022 2200 by Radha Lewis RN  Medical Device Protection:   IV pole/bag removed from visual field   tubing secured   torso covered  Less Restrictive Alternative:   bed alarm in use   sensory stimulation provided   safety enhancements provided  De-Escalation Techniques:   increased round frequency   medication administered   quiet time facilitated   reoriented   stimulation decreased  Diversional Activities: television  Taken 7/31/2022 2100 by Radha Lewis RN  Medical Device Protection:   IV pole/bag removed from visual field   torso covered   tubing secured  Taken 7/31/2022 2000 by Radha Lewis RN  Medical Device Protection:   IV pole/bag removed from visual field   torso covered   tubing secured  Less Restrictive Alternative:   bed alarm in use   sensory stimulation provided   safety enhancements provided  De-Escalation Techniques:   appropriate behavior reinforced   increased round frequency   medication administered   quiet time facilitated   reoriented   stimulation decreased   time out facilitated   verbally redirected  Diversional Activities: television   Goal Outcome Evaluation:            No acute events overnight. Patient able to move all extremities, but has no purposeful movement. Discussed plan with family. Decision on who will be POA should be made today. Sedation titrated. K+ replaced. BP elevated, order  for PRN Hydralazine received. UO stable. Restraints checked per protocol. Will continue to monitor.

## 2022-08-01 NOTE — PROGRESS NOTES
Dr. SUNITA Abreu    [facility] INTENSIVE CARE    8/1/2022    Patient ID:  Name:  Juventino Reyes Moreno  MRN:  [id number]  1995  27 y.o.  male            CC/Reason for visit: Polysubstance abuse, acute respiratory failure     Interval hx: Patient intubated, mechanically ventilated.  Sedated, not responding to any commands but he does focalized pain and flails around, restless when stimulated tactile.  I reviewed mechanical ventilator settings     ROS: Unobtainable.    Vitals:  Vitals:    08/01/22 0744 08/01/22 0800 08/01/22 0830 08/01/22 0900   BP:  169/89 154/92 127/79   BP Location:       Patient Position:       Pulse: 109 109 102 108   Resp: 18      Temp: 99.86 °F (37.7 °C) 99.9 °F (37.7 °C) 99.9 °F (37.7 °C) 100 °F (37.8 °C)   TempSrc:       SpO2: 97% 97% 97% 96%   Weight:         FiO2 (%): 25 %     There is no height or weight on file to calculate BMI.    Intake/Output Summary (Last 24 hours) at 8/1/2022 1113  Last data filed at 8/1/2022 0600  Gross per 24 hour   Intake 3538 ml   Output 2325 ml   Net 1213 ml       Exam:  GEN:  Intubated  Mechanically ventilated  Sedated  To painful sternal rub he will flail around and grimace but does not open eyes, does not follow commands  LUNGS: Clear breath sounds bilat, no use of accessory muscles  CV:  Normal S1S2, without murmur, no edema  ABD:  Non tender, no enlarged liver or masses      Scheduled meds:  diazePAM, 10 mg, Nasogastric, Q12H  enoxaparin, 40 mg, Subcutaneous, Nightly  famotidine, 20 mg, Oral, BID AC  HYDROmorphone, 2 mg, Nasogastric, Q8H  insulin regular, 0-14 Units, Subcutaneous, Q6H  sodium chloride, 10 mL, Intravenous, Q12H      IV meds:                      midazolam, 1-10 mg/hr, Last Rate: 9 mg/hr (08/01/22 0836)  propofol, 5-50 mcg/kg/min, Last Rate: 10 mcg/kg/min (08/01/22 1108)  sodium chloride, 125 mL/hr, Last Rate: 125 mL/hr (08/01/22 7812)        Data Review:   I reviewed the patient's medications and new clinical  results.    COVID19   Date Value Ref Range Status   07/30/2022 Not Detected Not Detected - Ref. Range Final         Lab Results   Component Value Date    CALCIUM 8.3 (L) 08/01/2022    MG 1.9 08/01/2022    MG 2.1 07/31/2022     Results from last 7 days   Lab Units 08/01/22  0337 07/31/22  1636 07/31/22  1635 07/31/22  0318 07/30/22  1755   SODIUM mmol/L 140  --  141 142 137   POTASSIUM mmol/L 3.4*  --  3.8 2.8* 3.4*   CHLORIDE mmol/L 107  --  109* 106 103   CO2 mmol/L 23.0  --  23.8 24.0 20.0*   BUN mg/dL 5*  --  6 10 14   CREATININE mg/dL 0.66*  --  0.66* 0.71* 1.02   CALCIUM mg/dL 8.3*  --  8.3* 8.2* 8.5*   BILIRUBIN mg/dL 0.6 0.8 0.7  --  0.7   ALK PHOS U/L 67 63 66  --  74   ALT (SGPT) U/L 15 17 15  --  18   AST (SGOT) U/L 16 17 20  --  28   GLUCOSE mg/dL 98  --  113* 98 158*   WBC 10*3/mm3 9.36  --   --  9.48 11.44*   HEMOGLOBIN g/dL 14.8  --   --  15.7 15.1   PLATELETS 10*3/mm3 227  --   --  243 258   INR   --  1.32*  --   --   --                  Results from last 7 days   Lab Units 07/30/22  1914   PH, ARTERIAL pH units 7.343*   PCO2, ARTERIAL mm Hg 45.0   PO2 ART mm Hg 154.4*   MODALITY  Adult Vent   O2 SATURATION CALC % 99.2*       CrCl cannot be calculated (Unknown ideal weight.).      ASSESSMENT:   Narcotic overdose (HCC)  Polysubstance abuse  Need for mechanical ventilation, inability to protect airway due to drug overdose  Acute toxic encephalopathy, agitation  Acetaminophen overdose  Leukocytosis  Low potassium      PLAN:  Patient remains quite encephalopathic when we try sedation vacation.  For now we are continuing with Valium and Dilaudid by mouth and try to wean his sedative drips.  He is still requiring propofol and Versed drip.  Cannot try spontaneous breathing trial while the patient is neurologically inappropriate.  Continue stress ulcer prophylaxis and Lovenox for DVT prophylaxis.    Critical care time 34 minutes, excluding any separately billable procedure times.      Bandar Abreu,  MD  8/1/2022

## 2022-08-01 NOTE — NURSING NOTE
Spoke with Mecca, family member. She stated that she is the only family member in the [place] that speaks english. She is to be notified of changes and then she will relay the information to the family. She also stated that patients siblings will get on a conference call to mother to decide a POA and will let staff know tomorrow.    Mecca's phone number is [phone]
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
## 2022-08-01 NOTE — PROGRESS NOTES
Discharge Planning Assessment  [facility]     Patient Name: Juventino Reyes Moreno  MRN: [id number]  Today's Date: 8/1/2022    Admit Date: 7/30/2022     Discharge Needs Assessment     Row Name 08/01/22 1505       Living Environment    People in Home other relative(s);sibling(s)    Name(s) of People in Home sister and her  Ronald    Current Living Arrangements apartment    Primary Care Provided by self    Provides Primary Care For no one    Family Caregiver if Needed none    Quality of Family Relationships supportive       Resource/Environmental Concerns    Transportation Concerns none       Transition Planning    Patient/Family Anticipates Transition to home with family    Patient/Family Anticipated Services at Transition none    Transportation Anticipated family or friend will provide       Discharge Needs Assessment    Equipment Currently Used at Home none    Concerns to be Addressed discharge planning    Anticipated Changes Related to Illness none    Equipment Needed After Discharge none    Provided Post Acute Provider List? N/A               Discharge Plan     Row Name 08/01/22 1506       Plan    Plan Plan is undetermined    Plan Comments IMM noted.  CCP spoke to patient’s family  at bedside. Face sheet verified.  CCP role explained and discharge planning discussed.  His brother in law [facility] [phone]  is his emergency contact.   Pt  does not have a PCP.   Pt lives in an apartment with his sister and brother in law.  He uses no DME to ambulate.   He is independent with ADL’s.   He has no history of HH.  He has a no rehab stays. Plan is undetermined  Access to evaluate  CCP following              Continued Care and Services - Admitted Since 7/30/2022    Coordination has not been started for this encounter.          Demographic Summary    No documentation.                Functional Status    No documentation.                Psychosocial    No documentation.                Abuse/Neglect    No  documentation.                Legal    No documentation.                Substance Abuse    No documentation.                Patient Forms    No documentation.                   Pia Sotelo RN

## 2022-08-02 LAB
ANION GAP SERPL CALCULATED.3IONS-SCNC: 7 MMOL/L (ref 5–15)
BASOPHILS # BLD AUTO: 0.02 10*3/MM3 (ref 0–0.2)
BASOPHILS NFR BLD AUTO: 0.3 % (ref 0–1.5)
BUN SERPL-MCNC: 7 MG/DL (ref 6–20)
BUN/CREAT SERPL: 11.9 (ref 7–25)
CALCIUM SPEC-SCNC: 8.3 MG/DL (ref 8.6–10.5)
CHLORIDE SERPL-SCNC: 103 MMOL/L (ref 98–107)
CO2 SERPL-SCNC: 26 MMOL/L (ref 22–29)
CREAT SERPL-MCNC: 0.59 MG/DL (ref 0.76–1.27)
DEPRECATED RDW RBC AUTO: 41.7 FL (ref 37–54)
EGFRCR SERPLBLD CKD-EPI 2021: 135.5 ML/MIN/1.73
EOSINOPHIL # BLD AUTO: 0.3 10*3/MM3 (ref 0–0.4)
EOSINOPHIL NFR BLD AUTO: 4 % (ref 0.3–6.2)
ERYTHROCYTE [DISTWIDTH] IN BLOOD BY AUTOMATED COUNT: 13.4 % (ref 12.3–15.4)
GLUCOSE BLDC GLUCOMTR-MCNC: 111 MG/DL (ref 70–130)
GLUCOSE BLDC GLUCOMTR-MCNC: 151 MG/DL (ref 70–130)
GLUCOSE BLDC GLUCOMTR-MCNC: 168 MG/DL (ref 70–130)
GLUCOSE BLDC GLUCOMTR-MCNC: 97 MG/DL (ref 70–130)
GLUCOSE SERPL-MCNC: 116 MG/DL (ref 65–99)
HCT VFR BLD AUTO: 39.3 % (ref 37.5–51)
HGB BLD-MCNC: 13.2 G/DL (ref 13–17.7)
LYMPHOCYTES # BLD AUTO: 1.25 10*3/MM3 (ref 0.7–3.1)
LYMPHOCYTES NFR BLD AUTO: 16.6 % (ref 19.6–45.3)
MCH RBC QN AUTO: 28.7 PG (ref 26.6–33)
MCHC RBC AUTO-ENTMCNC: 33.6 G/DL (ref 31.5–35.7)
MCV RBC AUTO: 85.4 FL (ref 79–97)
MONOCYTES # BLD AUTO: 0.59 10*3/MM3 (ref 0.1–0.9)
MONOCYTES NFR BLD AUTO: 7.9 % (ref 5–12)
NEUTROPHILS NFR BLD AUTO: 5.33 10*3/MM3 (ref 1.7–7)
NEUTROPHILS NFR BLD AUTO: 70.9 % (ref 42.7–76)
PLAT MORPH BLD: NORMAL
PLATELET # BLD AUTO: 195 10*3/MM3 (ref 140–450)
PMV BLD AUTO: 11.2 FL (ref 6–12)
POTASSIUM SERPL-SCNC: 3.7 MMOL/L (ref 3.5–5.2)
RBC # BLD AUTO: 4.6 10*6/MM3 (ref 4.14–5.8)
RBC MORPH BLD: NORMAL
SODIUM SERPL-SCNC: 136 MMOL/L (ref 136–145)
WBC MORPH BLD: NORMAL
WBC NRBC COR # BLD: 7.51 10*3/MM3 (ref 3.4–10.8)

## 2022-08-02 PROCEDURE — 25010000002 FENTANYL CITRATE (PF) 50 MCG/ML SOLUTION: Performed by: INTERNAL MEDICINE

## 2022-08-02 PROCEDURE — 94760 N-INVAS EAR/PLS OXIMETRY 1: CPT

## 2022-08-02 PROCEDURE — 63710000001 INSULIN REGULAR HUMAN PER 5 UNITS: Performed by: INTERNAL MEDICINE

## 2022-08-02 PROCEDURE — 25010000002 MIDAZOLAM 50 MG/10ML SOLUTION: Performed by: INTERNAL MEDICINE

## 2022-08-02 PROCEDURE — 82962 GLUCOSE BLOOD TEST: CPT

## 2022-08-02 PROCEDURE — 94003 VENT MGMT INPAT SUBQ DAY: CPT

## 2022-08-02 PROCEDURE — 94799 UNLISTED PULMONARY SVC/PX: CPT

## 2022-08-02 PROCEDURE — 25010000002 ENOXAPARIN PER 10 MG: Performed by: INTERNAL MEDICINE

## 2022-08-02 PROCEDURE — 94761 N-INVAS EAR/PLS OXIMETRY MLT: CPT

## 2022-08-02 PROCEDURE — 85025 COMPLETE CBC W/AUTO DIFF WBC: CPT | Performed by: INTERNAL MEDICINE

## 2022-08-02 PROCEDURE — 85007 BL SMEAR W/DIFF WBC COUNT: CPT | Performed by: INTERNAL MEDICINE

## 2022-08-02 PROCEDURE — 25010000002 PROPOFOL 10 MG/ML EMULSION: Performed by: EMERGENCY MEDICINE

## 2022-08-02 PROCEDURE — 80048 BASIC METABOLIC PNL TOTAL CA: CPT | Performed by: INTERNAL MEDICINE

## 2022-08-02 RX ORDER — DIAZEPAM 5 MG/1
5 TABLET ORAL EVERY 12 HOURS
Status: DISCONTINUED | OUTPATIENT
Start: 2022-08-02 | End: 2022-08-03

## 2022-08-02 RX ORDER — HYDROMORPHONE HYDROCHLORIDE 2 MG/1
1 TABLET ORAL EVERY 8 HOURS
Status: DISCONTINUED | OUTPATIENT
Start: 2022-08-02 | End: 2022-08-03

## 2022-08-02 RX ORDER — FENTANYL CITRATE 50 UG/ML
50 INJECTION, SOLUTION INTRAMUSCULAR; INTRAVENOUS
Status: DISCONTINUED | OUTPATIENT
Start: 2022-08-02 | End: 2022-08-03

## 2022-08-02 RX ORDER — FENTANYL CITRATE 50 UG/ML
75 INJECTION, SOLUTION INTRAMUSCULAR; INTRAVENOUS ONCE
Status: DISCONTINUED | OUTPATIENT
Start: 2022-08-02 | End: 2022-08-03

## 2022-08-02 RX ORDER — HYDROMORPHONE HYDROCHLORIDE 2 MG/1
1 TABLET ORAL EVERY 8 HOURS
Status: DISCONTINUED | OUTPATIENT
Start: 2022-08-02 | End: 2022-08-02

## 2022-08-02 RX ADMIN — FENTANYL CITRATE 100 MCG: 50 INJECTION INTRAMUSCULAR; INTRAVENOUS at 10:18

## 2022-08-02 RX ADMIN — PROPOFOL 25 MCG/KG/MIN: 10 INJECTION, EMULSION INTRAVENOUS at 04:24

## 2022-08-02 RX ADMIN — FENTANYL CITRATE 100 MCG: 50 INJECTION INTRAMUSCULAR; INTRAVENOUS at 03:32

## 2022-08-02 RX ADMIN — Medication 10 ML: at 20:38

## 2022-08-02 RX ADMIN — FAMOTIDINE 20 MG: 20 TABLET ORAL at 18:50

## 2022-08-02 RX ADMIN — DIAZEPAM 5 MG: 5 TABLET ORAL at 23:31

## 2022-08-02 RX ADMIN — HYDROMORPHONE HYDROCHLORIDE 1 MG: 2 TABLET ORAL at 12:06

## 2022-08-02 RX ADMIN — SODIUM CHLORIDE 125 ML/HR: 9 INJECTION, SOLUTION INTRAVENOUS at 09:48

## 2022-08-02 RX ADMIN — ENOXAPARIN SODIUM 40 MG: 100 INJECTION SUBCUTANEOUS at 20:38

## 2022-08-02 RX ADMIN — MIDAZOLAM 5 MG/HR: 5 INJECTION INTRAMUSCULAR; INTRAVENOUS at 06:16

## 2022-08-02 RX ADMIN — HYDROMORPHONE HYDROCHLORIDE 2 MG: 2 TABLET ORAL at 03:03

## 2022-08-02 RX ADMIN — FAMOTIDINE 20 MG: 20 TABLET ORAL at 08:08

## 2022-08-02 RX ADMIN — Medication 10 ML: at 09:48

## 2022-08-02 RX ADMIN — HYDROMORPHONE HYDROCHLORIDE 1 MG: 2 TABLET ORAL at 20:38

## 2022-08-02 RX ADMIN — FENTANYL CITRATE 100 MCG: 50 INJECTION INTRAMUSCULAR; INTRAVENOUS at 00:15

## 2022-08-02 RX ADMIN — DIAZEPAM 5 MG: 5 TABLET ORAL at 12:06

## 2022-08-02 RX ADMIN — INSULIN HUMAN 3 UNITS: 100 INJECTION, SOLUTION PARENTERAL at 23:46

## 2022-08-02 NOTE — PROGRESS NOTES
Dr. SUNITA Abreu    [facility] INTENSIVE CARE    8/2/2022    Patient ID:  Name:  Juventino Reyes Moreno  MRN:  [id number]  1995  27 y.o.  male            CC/Reason for visit: Polysubstance abuse, acute respiratory failure     Interval hx: Patient intubated, mechanically ventilated, now following a few simple commands with his family at bedside.  Apparently he speaks Emir[place].  I speak fluent [place] but he is not making eye contact with me.  He did squeeze his hands appropriately for his mother.  He is signaling that he wants the endotracheal tube removed.  He is currently in soft restraints.  I cannot perform an adequate evaluation of his neurologic status since he is quite restless and not following my direct commands.     ROS: Unobtainable.    Vitals:  Vitals:    08/02/22 1230 08/02/22 1300 08/02/22 1330 08/02/22 1340   BP: 126/70 128/70 (!) 145/108    Pulse: 93 94 110 102   Resp:       Temp: 99.5 °F (37.5 °C) 99.5 °F (37.5 °C) 99.86 °F (37.7 °C) 99.86 °F (37.7 °C)   TempSrc:       SpO2: 98% 98% 96% 99%   Weight:       Height:         FiO2 (%): 25 %     Body mass index is 27.12 kg/m².    Intake/Output Summary (Last 24 hours) at 8/2/2022 1432  Last data filed at 8/2/2022 0500  Gross per 24 hour   Intake 6233 ml   Output 1225 ml   Net 5008 ml       Exam:  GEN:  Young  male patient was quite restless, intubated and mechanically ventilated  Alert, nods appropriately to his mother's voice.  Squeezes his mother's hands but does not squeeze my hands, does not make eye contact with me.  LUNGS: Clear breath sounds bilat, no use of accessory muscles  CV:  Normal S1S2, without murmur, no edema  ABD:  Non tender, no enlarged liver or masses      Scheduled meds:  diazePAM, 5 mg, Nasogastric, Q12H  enoxaparin, 40 mg, Subcutaneous, Nightly  famotidine, 20 mg, Nasogastric, BID AC  fentaNYL citrate (PF), 75 mcg, Intravenous, Once  HYDROmorphone, 1 mg, Nasogastric, Q8H  insulin regular, 0-14 Units,  Subcutaneous, Q6H  sodium chloride, 10 mL, Intravenous, Q12H      IV meds:                      sodium chloride, 50 mL/hr, Last Rate: 50 mL/hr (08/02/22 1129)        Data Review:   I reviewed the patient's medications and new clinical results.    COVID19   Date Value Ref Range Status   07/30/2022 Not Detected Not Detected - Ref. Range Final         Lab Results   Component Value Date    CALCIUM 8.3 (L) 08/02/2022    MG 1.9 08/01/2022    MG 2.1 07/31/2022     Results from last 7 days   Lab Units 08/02/22  0355 08/01/22  1857 08/01/22  0337 07/31/22  1636 07/31/22  1635 07/31/22  0318   SODIUM mmol/L 136  --  140  --  141 142   POTASSIUM mmol/L 3.7 4.1 3.4*  --  3.8 2.8*   CHLORIDE mmol/L 103  --  107  --  109* 106   CO2 mmol/L 26.0  --  23.0  --  23.8 24.0   BUN mg/dL 7  --  5*  --  6 10   CREATININE mg/dL 0.59*  --  0.66*  --  0.66* 0.71*   CALCIUM mg/dL 8.3*  --  8.3*  --  8.3* 8.2*   BILIRUBIN mg/dL  --   --  0.6 0.8 0.7  --    ALK PHOS U/L  --   --  67 63 66  --    ALT (SGPT) U/L  --   --  15 17 15  --    AST (SGOT) U/L  --   --  16 17 20  --    GLUCOSE mg/dL 116*  --  98  --  113* 98   WBC 10*3/mm3 7.51  --  9.36  --   --  9.48   HEMOGLOBIN g/dL 13.2  --  14.8  --   --  15.7   PLATELETS 10*3/mm3 195  --  227  --   --  243   INR   --   --   --  1.32*  --   --                  Results from last 7 days   Lab Units 07/30/22  1914   PH, ARTERIAL pH units 7.343*   PCO2, ARTERIAL mm Hg 45.0   PO2 ART mm Hg 154.4*   MODALITY  Adult Vent   O2 SATURATION CALC % 99.2*             ASSESSMENT:   Narcotic overdose (HCC)  Polysubstance abuse  Need for mechanical ventilation, inability to protect airway due to drug overdose  Acute toxic encephalopathy, agitation  Acetaminophen overdose  Leukocytosis  Low potassium      PLAN:  Patient seems neurologically appropriate.  He is  and speaks fluent Egyptian.  I speak Egyptian as well but having difficulty communicating with him.  With his mother at bedside he does squeeze her  hands and he does occasionally nod to her questions and commands.  He is not following my commands but he seems able to protect his airway.  He is saturating 94% on FiO2 of 30%.  He seems adequate for extubation.  We will proceed with extubation now.  We have stopped propofol and Versed drips completely several hours ago.  We will use as needed IV benzos and narcotics for his withdrawal.  He is a polysubstance abuser and has high tolerance.  Labs are unremarkable today.  Continue Lovenox for DVT prophylaxis.  Mental health consultation with a  will be necessary.        Bandar Abreu MD  8/2/2022

## 2022-08-02 NOTE — PLAN OF CARE
Problem: Restraint, Nonbehavioral (Nonviolent)  Goal: Absence of Harm or Injury  Outcome: Ongoing, Progressing  Intervention: Implement Least Restrictive Safety Strategies  Recent Flowsheet Documentation  Taken 8/2/2022 0400 by nAa Malloy RN  Medical Device Protection:   IV pole/bag removed from visual field   tubing secured  Less Restrictive Alternative:   surveillance provided   safety enhancements provided   environment adjusted  De-Escalation Techniques:   appropriate behavior reinforced   diversional activity encouraged  Diversional Activities: television  Taken 8/2/2022 0200 by Ana Malloy, RN  Medical Device Protection: IV pole/bag removed from visual field  Less Restrictive Alternative:   surveillance provided   safety enhancements provided   environment adjusted  De-Escalation Techniques:   medication offered   stimulation decreased  Diversional Activities: television  Taken 8/2/2022 0000 by Ana Malloy RN  Medical Device Protection: IV pole/bag removed from visual field  Less Restrictive Alternative:   surveillance provided   safety enhancements provided   environment adjusted  De-Escalation Techniques:   appropriate behavior reinforced   verbally redirected   reoriented  Diversional Activities: television  Taken 8/1/2022 2200 by Ana Malloy, RN  Medical Device Protection: IV pole/bag removed from visual field  Less Restrictive Alternative:   surveillance provided   safety enhancements provided   environment adjusted  De-Escalation Techniques:   appropriate behavior reinforced   increased round frequency  Diversional Activities: television  Taken 8/1/2022 2000 by Ana Malloy RN  Medical Device Protection: IV pole/bag removed from visual field  Less Restrictive Alternative:   surveillance provided   safety enhancements provided   environment adjusted  De-Escalation Techniques:   stimulation decreased   verbally redirected  Diversional Activities: television  Taken 8/1/2022 1900 by Mellissa  WILLA Perez  Less Restrictive Alternative: calming techniques promoted  De-Escalation Techniques:   reoriented   medication offered  Diversional Activities: television  Intervention: Protect Skin and Joint Integrity  Recent Flowsheet Documentation  Taken 8/2/2022 0200 by Ana Malloy RN  Body Position:   turned   right  Taken 8/2/2022 0000 by Ana Malloy RN  Body Position:   turned   left  Taken 8/1/2022 2200 by Ana Malloy RN  Body Position:   turned   supine  Taken 8/1/2022 2000 by Ana Malloy RN  Body Position:   turned   left     Problem: Fall Injury Risk  Goal: Absence of Fall and Fall-Related Injury  Outcome: Ongoing, Progressing  Intervention: Promote Injury-Free Environment  Recent Flowsheet Documentation  Taken 8/2/2022 0300 by Ana Malloy RN  Safety Promotion/Fall Prevention: safety round/check completed  Taken 8/2/2022 0200 by Ana Malloy RN  Safety Promotion/Fall Prevention: safety round/check completed  Taken 8/2/2022 0100 by Ana Malloy RN  Safety Promotion/Fall Prevention: safety round/check completed  Taken 8/2/2022 0000 by Ana Malloy RN  Safety Promotion/Fall Prevention: safety round/check completed  Taken 8/1/2022 2315 by Ana Malloy RN  Safety Promotion/Fall Prevention: safety round/check completed  Taken 8/1/2022 2200 by Ana Malloy RN  Safety Promotion/Fall Prevention: safety round/check completed  Taken 8/1/2022 2100 by Ana Malloy RN  Safety Promotion/Fall Prevention: safety round/check completed  Taken 8/1/2022 2000 by Ana Malloy RN  Safety Promotion/Fall Prevention: safety round/check completed     Problem: Skin Injury Risk Increased  Goal: Skin Health and Integrity  Outcome: Ongoing, Progressing  Intervention: Optimize Skin Protection  Recent Flowsheet Documentation  Taken 8/2/2022 0200 by Ana Malloy RN  Head of Bed (HOB) Positioning: HOB elevated  Taken 8/2/2022 0000 by Ana Malloy RN  Head of Bed (HOB) Positioning: HOB elevated  Taken 8/1/2022  2200 by Ana Malloy RN  Head of Bed (HOB) Positioning: HOB elevated  Taken 8/1/2022 2000 by Ana Malloy RN  Head of Bed (HOB) Positioning: HOB elevated     Problem: Adult Inpatient Plan of Care  Goal: Plan of Care Review  Outcome: Ongoing, Progressing  Goal: Patient-Specific Goal (Individualized)  Outcome: Ongoing, Progressing  Goal: Absence of Hospital-Acquired Illness or Injury  Outcome: Ongoing, Progressing  Intervention: Identify and Manage Fall Risk  Recent Flowsheet Documentation  Taken 8/2/2022 0300 by Ana Malloy RN  Safety Promotion/Fall Prevention: safety round/check completed  Taken 8/2/2022 0200 by Ana Malloy RN  Safety Promotion/Fall Prevention: safety round/check completed  Taken 8/2/2022 0100 by Ana Malloy RN  Safety Promotion/Fall Prevention: safety round/check completed  Taken 8/2/2022 0000 by Ana Malloy RN  Safety Promotion/Fall Prevention: safety round/check completed  Taken 8/1/2022 2315 by Ana Malloy RN  Safety Promotion/Fall Prevention: safety round/check completed  Taken 8/1/2022 2200 by Ana Malloy RN  Safety Promotion/Fall Prevention: safety round/check completed  Taken 8/1/2022 2100 by Ana Malloy RN  Safety Promotion/Fall Prevention: safety round/check completed  Taken 8/1/2022 2000 by Ana Malloy RN  Safety Promotion/Fall Prevention: safety round/check completed  Intervention: Prevent Skin Injury  Recent Flowsheet Documentation  Taken 8/2/2022 0200 by Ana Malloy RN  Body Position:   turned   right  Taken 8/2/2022 0000 by Ana Malloy RN  Body Position:   turned   left  Taken 8/1/2022 2200 by Ana Malloy RN  Body Position:   turned   supine  Taken 8/1/2022 2000 by Ana Malloy RN  Body Position:   turned   left  Intervention: Prevent and Manage VTE (Venous Thromboembolism) Risk  Recent Flowsheet Documentation  Taken 8/2/2022 0200 by Ana Malloy RN  Activity Management: bedrest  Taken 8/2/2022 0000 by Ana Malloy RN  Activity Management:  bedrest  Taken 8/1/2022 2200 by Ana Malloy, RN  Activity Management: bedrest  Taken 8/1/2022 2000 by Ana Malloy, RN  Activity Management: bedrest  VTE Prevention/Management:   sequential compression devices on   bilateral  Goal: Optimal Comfort and Wellbeing  Outcome: Ongoing, Progressing  Goal: Readiness for Transition of Care  Outcome: Ongoing, Progressing   Goal Outcome Evaluation:

## 2022-08-03 LAB
ANION GAP SERPL CALCULATED.3IONS-SCNC: 10.4 MMOL/L (ref 5–15)
BASOPHILS # BLD AUTO: 0.02 10*3/MM3 (ref 0–0.2)
BASOPHILS NFR BLD AUTO: 0.3 % (ref 0–1.5)
BUN SERPL-MCNC: 5 MG/DL (ref 6–20)
BUN/CREAT SERPL: 8.3 (ref 7–25)
CALCIUM SPEC-SCNC: 8.6 MG/DL (ref 8.6–10.5)
CHLORIDE SERPL-SCNC: 104 MMOL/L (ref 98–107)
CO2 SERPL-SCNC: 25.6 MMOL/L (ref 22–29)
CREAT SERPL-MCNC: 0.6 MG/DL (ref 0.76–1.27)
DEPRECATED RDW RBC AUTO: 41.1 FL (ref 37–54)
EGFRCR SERPLBLD CKD-EPI 2021: 134.8 ML/MIN/1.73
EOSINOPHIL # BLD AUTO: 0.22 10*3/MM3 (ref 0–0.4)
EOSINOPHIL NFR BLD AUTO: 3 % (ref 0.3–6.2)
ERYTHROCYTE [DISTWIDTH] IN BLOOD BY AUTOMATED COUNT: 13.3 % (ref 12.3–15.4)
GLUCOSE BLDC GLUCOMTR-MCNC: 139 MG/DL (ref 70–130)
GLUCOSE BLDC GLUCOMTR-MCNC: 142 MG/DL (ref 70–130)
GLUCOSE BLDC GLUCOMTR-MCNC: 145 MG/DL (ref 70–130)
GLUCOSE BLDC GLUCOMTR-MCNC: 166 MG/DL (ref 70–130)
GLUCOSE SERPL-MCNC: 152 MG/DL (ref 65–99)
HCT VFR BLD AUTO: 42.8 % (ref 37.5–51)
HGB BLD-MCNC: 14 G/DL (ref 13–17.7)
LYMPHOCYTES # BLD AUTO: 1.05 10*3/MM3 (ref 0.7–3.1)
LYMPHOCYTES NFR BLD AUTO: 14.3 % (ref 19.6–45.3)
MCH RBC QN AUTO: 28.2 PG (ref 26.6–33)
MCHC RBC AUTO-ENTMCNC: 32.7 G/DL (ref 31.5–35.7)
MCV RBC AUTO: 86.1 FL (ref 79–97)
MONOCYTES # BLD AUTO: 0.66 10*3/MM3 (ref 0.1–0.9)
MONOCYTES NFR BLD AUTO: 9 % (ref 5–12)
NEUTROPHILS NFR BLD AUTO: 5.34 10*3/MM3 (ref 1.7–7)
NEUTROPHILS NFR BLD AUTO: 73 % (ref 42.7–76)
PLATELET # BLD AUTO: 135 10*3/MM3 (ref 140–450)
PMV BLD AUTO: 11.4 FL (ref 6–12)
POTASSIUM SERPL-SCNC: 3.7 MMOL/L (ref 3.5–5.2)
RBC # BLD AUTO: 4.97 10*6/MM3 (ref 4.14–5.8)
SODIUM SERPL-SCNC: 140 MMOL/L (ref 136–145)
WBC NRBC COR # BLD: 7.32 10*3/MM3 (ref 3.4–10.8)

## 2022-08-03 PROCEDURE — 80048 BASIC METABOLIC PNL TOTAL CA: CPT | Performed by: INTERNAL MEDICINE

## 2022-08-03 PROCEDURE — 85025 COMPLETE CBC W/AUTO DIFF WBC: CPT | Performed by: INTERNAL MEDICINE

## 2022-08-03 PROCEDURE — 82962 GLUCOSE BLOOD TEST: CPT

## 2022-08-03 PROCEDURE — 92610 EVALUATE SWALLOWING FUNCTION: CPT

## 2022-08-03 PROCEDURE — 85007 BL SMEAR W/DIFF WBC COUNT: CPT | Performed by: INTERNAL MEDICINE

## 2022-08-03 RX ORDER — HYDROCODONE BITARTRATE AND ACETAMINOPHEN 5; 325 MG/1; MG/1
1 TABLET ORAL EVERY 4 HOURS PRN
Status: DISCONTINUED | OUTPATIENT
Start: 2022-08-03 | End: 2022-08-04

## 2022-08-03 RX ORDER — ALPRAZOLAM 0.5 MG/1
0.5 TABLET ORAL 2 TIMES DAILY PRN
Status: DISCONTINUED | OUTPATIENT
Start: 2022-08-03 | End: 2022-08-05

## 2022-08-03 RX ADMIN — DIAZEPAM 5 MG: 5 TABLET ORAL at 11:06

## 2022-08-03 RX ADMIN — FAMOTIDINE 20 MG: 20 TABLET ORAL at 06:30

## 2022-08-03 RX ADMIN — Medication 10 ML: at 08:13

## 2022-08-03 RX ADMIN — HYDROMORPHONE HYDROCHLORIDE 1 MG: 2 TABLET ORAL at 03:02

## 2022-08-03 RX ADMIN — ALPRAZOLAM 0.5 MG: 0.5 TABLET ORAL at 21:24

## 2022-08-03 RX ADMIN — HYDROMORPHONE HYDROCHLORIDE 1 MG: 2 TABLET ORAL at 11:06

## 2022-08-03 RX ADMIN — Medication 10 ML: at 21:24

## 2022-08-03 NOTE — PROGRESS NOTES
Dr. SUNITA Abreu    [facility] INTENSIVE CARE    8/3/2022    Patient ID:  Name:  Juventino ReyesMoreno  MRN:  [id number]  1995  27 y.o.  male            CC/Reason for visit: Polysubstance abuse, acute respiratory failure     Interval hx: Patient extubated yesterday.  Today I spoke to him in Italian and he has fluent speech.  He is just not very communicative.  Seems depressed.  Needs mental health evaluation due to polysubstance abuse.       ROS: Says he has a mild headache.  Denies chest pain or abdominal pain    Vitals:  Vitals:    08/03/22 0700 08/03/22 0800 08/03/22 0900 08/03/22 1000   BP: 140/85 127/75 125/81 133/79   Pulse: 87 78 84 92   Resp:       Temp:       TempSrc:       SpO2: 97% 97% 96% 96%   Weight:       Height:         FiO2 (%): 25 %     Body mass index is 25.95 kg/m².    Intake/Output Summary (Last 24 hours) at 8/3/2022 1156  Last data filed at 8/3/2022 1108  Gross per 24 hour   Intake 2205 ml   Output 3450 ml   Net -1245 ml       Exam:  GEN:  No distress, young  male  Alert, oriented x 3.  Moves all 4 extremities without focal deficits  LUNGS: Clear breath sounds bilat, no use of accessory muscles  CV:  Normal S1S2, without murmur, no edema  ABD:  Non tender, no enlarged liver or masses      Scheduled meds:  diazePAM, 5 mg, Nasogastric, Q12H  enoxaparin, 40 mg, Subcutaneous, Nightly  famotidine, 20 mg, Nasogastric, BID AC  fentaNYL citrate (PF), 75 mcg, Intravenous, Once  HYDROmorphone, 1 mg, Nasogastric, Q8H  insulin regular, 0-14 Units, Subcutaneous, Q6H  sodium chloride, 10 mL, Intravenous, Q12H      IV meds:                      sodium chloride, 50 mL/hr, Last Rate: 50 mL/hr (08/02/22 1129)        Data Review:   I reviewed the patient's medications and new clinical results.    COVID19   Date Value Ref Range Status   07/30/2022 Not Detected Not Detected - Ref. Range Final         Lab Results   Component Value Date    CALCIUM 8.6 08/03/2022    MG 1.9 08/01/2022    MG 2.1  07/31/2022     Results from last 7 days   Lab Units 08/03/22  0321 08/02/22  0355 08/01/22  1857 08/01/22  0337 07/31/22  1636 07/31/22  1635   SODIUM mmol/L 140 136  --  140  --  141   POTASSIUM mmol/L 3.7 3.7 4.1 3.4*  --  3.8   CHLORIDE mmol/L 104 103  --  107  --  109*   CO2 mmol/L 25.6 26.0  --  23.0  --  23.8   BUN mg/dL 5* 7  --  5*  --  6   CREATININE mg/dL 0.60* 0.59*  --  0.66*  --  0.66*   CALCIUM mg/dL 8.6 8.3*  --  8.3*  --  8.3*   BILIRUBIN mg/dL  --   --   --  0.6 0.8 0.7   ALK PHOS U/L  --   --   --  67 63 66   ALT (SGPT) U/L  --   --   --  15 17 15   AST (SGOT) U/L  --   --   --  16 17 20   GLUCOSE mg/dL 152* 116*  --  98  --  113*   WBC 10*3/mm3 7.32 7.51  --  9.36  --   --    HEMOGLOBIN g/dL 14.0 13.2  --  14.8  --   --    PLATELETS 10*3/mm3 135* 195  --  227  --   --    INR   --   --   --   --  1.32*  --                  ASSESSMENT:   Narcotic overdose (HCC)  Polysubstance abuse  Need for mechanical ventilation, inability to protect airway due to drug overdose  Acute toxic encephalopathy, agitation  Acetaminophen overdose  Leukocytosis  Low potassium    PLAN:  Extubated yesterday.  Remove Henderson catheter today.  Advance oral diet today.  Consult mental health for evaluation of polysubstance abuse and depression.  Labs are unremarkable.  Start ambulating patient with physical therapy.  [facility]  Anticipate discharge in 72 hours if okay with mental health evaluation.        Bandar Abreu MD  8/3/2022

## 2022-08-03 NOTE — PLAN OF CARE
Goal Outcome Evaluation:  Plan of Care Reviewed With: patient           Outcome Evaluation: Patient seen for clinical swallow assessment.  ipad utilized for Stateless translation. Oral mech exam was remarkable for reduced intensity of voice. Occasional throat clearing appreciated, but did not correlate with a specific consistency. No overt s/s of aspiration with ice, thins via cup/straw, puree, mixed mech soft, or regular. No oral residue. Laryngeal elevation palpable and timely. Voice clear post swallow. SLP recs regular and thins. Meds as rebecca. Assist with meals. SLP to sign off.      Patient wearing a face mask during this therapy encounter. Therapist used appropriate personal protective equipment including mask, eye protection and gloves.  Mask used was standard procedure mask. Appropriate PPE was worn during the entire therapy session. Hand hygiene was completed before and after therapy session. Patient is not in enhanced droplet precautions.

## 2022-08-03 NOTE — PROGRESS NOTES
"Nutrition Services    Patient Name:  Juventino ReyesMoreno  YOB: 1995  MRN: [id number]  Admit Date:  7/30/2022    CLINICAL NUTRITION PROGRESS NOTE      Reason for Assessment Follow-up Protocol, Tube Feeding Assessment      Current Problems   Extubated today, awaiting SLP eval to start po diet.  Will follow po and need for nutrition supplements.          Encounter Information        Nutrition/Diet History:    n/a   Food Preferences:   n/a   Supplements:   n/a   Typical Activity Pattern:      Factors Affecting Intake: No factors at this time - potential swallow issues     Tests/Procedures: CT scan       Anthropometrics        Current Height  Current Weight  BMI kg/m2 Height: 172.7 cm (68\")  Weight: 77.4 kg (170 lb 10.2 oz) (08/03/22 0327)  Body mass index is 25.95 kg/m².       Admission Weight    Ideal Body Weight (IBW)    Usual Body Weight (UBW)        Trending Weight Hx     Weight Change              PTA: Wt Readings from Last 30 Encounters:   08/03/22 0327 77.4 kg (170 lb 10.2 oz)   08/02/22 0526 80.9 kg (178 lb 5.6 oz)   08/01/22 0400 67.9 kg (149 lb 11.1 oz)   07/31/22 0800 77.4 kg (170 lb 10.2 oz)   07/30/22 2045 77.4 kg (170 lb 10.2 oz)   07/30/22 1733 79.4 kg (175 lb)            Labs       Pertinent Labs    Results from last 7 days   Lab Units 08/03/22  0321 08/02/22  0355 08/01/22  1857 08/01/22  0337 07/31/22  1636 07/31/22  1635   SODIUM mmol/L 140 136  --  140  --  141   POTASSIUM mmol/L 3.7 3.7 4.1 3.4*  --  3.8   CHLORIDE mmol/L 104 103  --  107  --  109*   CO2 mmol/L 25.6 26.0  --  23.0  --  23.8   BUN mg/dL 5* 7  --  5*  --  6   CREATININE mg/dL 0.60* 0.59*  --  0.66*  --  0.66*   CALCIUM mg/dL 8.6 8.3*  --  8.3*  --  8.3*   BILIRUBIN mg/dL  --   --   --  0.6 0.8 0.7   ALK PHOS U/L  --   --   --  67 63 66   ALT (SGPT) U/L  --   --   --  15 17 15   AST (SGOT) U/L  --   --   --  16 17 20   GLUCOSE mg/dL 152* 116*  --  98  --  113*     Results from last 7 days   Lab Units 08/03/22  0321 " "08/02/22  0355 08/01/22  0337 07/31/22  0318   MAGNESIUM mg/dL  --   --  1.9 2.1   HEMOGLOBIN g/dL 14.0   < > 14.8 15.7   HEMATOCRIT % 42.8   < > 43.7 46.7   WBC 10*3/mm3 7.32   < > 9.36 9.48    < > = values in this interval not displayed.     Results from last 7 days   Lab Units 08/03/22  0321 08/02/22  0355 08/01/22 0337 07/31/22  1636 07/31/22  0318 07/30/22  1755   INR   --   --   --  1.32*  --   --    PLATELETS 10*3/mm3 135* 195 227  --  243 258     COVID19   Date Value Ref Range Status   07/30/2022 Not Detected Not Detected - Ref. Range Final     No results found for: HGBA1C       Medications           Scheduled Medications diazePAM, 5 mg, Nasogastric, Q12H  enoxaparin, 40 mg, Subcutaneous, Nightly  famotidine, 20 mg, Nasogastric, BID AC  fentaNYL citrate (PF), 75 mcg, Intravenous, Once  HYDROmorphone, 1 mg, Nasogastric, Q8H  insulin regular, 0-14 Units, Subcutaneous, Q6H  sodium chloride, 10 mL, Intravenous, Q12H       Infusions sodium chloride, 50 mL/hr, Last Rate: 50 mL/hr (08/02/22 1129)       PRN Medications •  dextrose  •  dextrose  •  fentaNYL citrate (PF)  •  glucagon (human recombinant)  •  hydrALAZINE  •  magnesium sulfate **OR** magnesium sulfate **OR** magnesium sulfate  •  potassium & sodium phosphates **OR** potassium & sodium phosphates  •  potassium chloride **OR** potassium chloride **OR** potassium chloride  •  [COMPLETED] Insert peripheral IV **AND** sodium chloride  •  sodium chloride     Physical Findings        Physical Appearance alert     NFPE Not applicable   --  Edema  no edema   Gastrointestinal non-distended    Tubes/Drains Cortrak, NG tube - RN plans to remove   Oral/Mouth Cavity missing teeth   Skin skin intact     Estimated/Assessed Needs       Energy Requirements    Height for Calculation  Height: 172.7 cm (68\")   Weight for Calculation 67.9 kg   Method for Estimation  30 kcal/kg   EST Needs (kcal/day) 2037       Protein Requirements    Weight for Calculation 67.9 kg   EST " Protein Needs (g/kg) 1.2 gm/kg   EST Daily Needs (g/day) 81       Fluid Requirements     Estimated Needs (mL/day)        Fluid Deficit    Current Na Level (mEq/L)    Desired Na Level (mEq/L)    Estimated Fluid Deficit (L)     --  Current Nutrition Orders & Evaluation of Intake       Oral Nutrition     Food Allergies NKFA   Current PO Diet NPO Diet NPO Type: Strict NPO   Supplement n/a   PO Evaluation     Trending % PO Intake        --  Nutrition Diagnosis        Nutrition Dx Problem 1 Problem: Needs Alternative Route    Etiology: Medical Diagnosis    Signs/Symptoms: NPO    Comment:            Intervention Goal        Intervention Goal(s) Maintain nutrition status, Reduce/improve symptoms, Meet estimated needs, Disease management/therapy, Initiate feeding/diet and Tolerate PO      Nutrition Intervention        RD Action Follow Tx Progress, Care plan reviewed and Recommend/ordered:      Nutrition Prescription        Diet Prescription npo   Supplement Prescription n/a   Prescription Ordered      -  Monitor/Evaluation        Monitor Per protocol, PO intake, Pertinent labs, Skin status, Symptoms, Swallow function     RD to follow per protocol.      Electronically signed by:  Leelee Gallagher RD  08/03/22 11:40 EDT

## 2022-08-03 NOTE — PLAN OF CARE
Goal Outcome Evaluation:              Outcome Evaluation: Pt transferred to 4 this afternoon. A&Ox4, alert, pleasant and cooperative.   IPAD Divehi utilized.  Denies SI, sitter at bedside, tolerating regular diet.  Up to gaines just outside of room with assist of one, c/o dizzyness, returned to bed.  Voiding well post chang catheter removal.  BrotherRonald, speaks english, notified via phone call of patient's new room number and states will visit after work hours.

## 2022-08-03 NOTE — THERAPY DISCHARGE NOTE
Acute Care - Speech Language Pathology   Swallow Initial Evaluation/Discharge [facility]     Patient Name: Juventino ReyesMoreno  : 1995  MRN: [id number]  Today's Date: 8/3/2022               Admit Date: 2022    Visit Dx:    ICD-10-CM ICD-9-CM   1. Narcotic overdose, undetermined intent, initial encounter ([facility])  T40.604A 967.9     E980.2   2. Acute respiratory failure, unspecified whether with hypoxia or hypercapnia ([facility])  J96.00 518.81     Patient Active Problem List   Diagnosis   • Narcotic overdose ([facility])     History reviewed. No pertinent past medical history.  History reviewed. No pertinent surgical history.    SLP Recommendation and Plan  SLP Swallowing Diagnosis: R/O pharyngeal dysphagia (22)  SLP Diet Recommendation: regular textures, thin liquids (22)                    Therapy Frequency (Swallow): evaluation only (22)                                     Plan of Care Reviewed With: patient (22 365)  Outcome Evaluation: Patient seen for clinical swallow assessment. Oral mech exam was remarkable for reduced intensity of voice. Occasional throat clearing appreciated, but did not correlate with a specific consistency. No overt s/s of aspiration with ice, thins via cup/straw, puree, mixed mech soft, or regular. No oral residue. Laryngeal elevation palpable and timely. Voice clear post swallow. SLP recs regular and thins. Meds as rebecca. Assist with meals. SLP to sign off. (22 1356)    SWALLOW EVALUATION (last 72 hours)     SLP Adult Swallow Evaluation     Row Name 22                   Rehab Evaluation    Document Type evaluation  -SH        Patient Effort adequate  -SH                  General Information    Patient Profile Reviewed yes  -SH        Pertinent History Of Current Problem Overdose with intubation  -SH        Current Method of Nutrition NPO  -SH        Precautions/Limitations, Vision WFL;for purposes of eval  -SH         Precautions/Limitations, Hearing WFL;for purposes of eval  [facility]        Prior Level of Function-Communication other (see comments)  Kinyarwanda speaking  ipad utilized  -        Prior Level of Function-Swallowing no diet consistency restrictions  -        Plans/Goals Discussed with patient;agreed upon  [facility]        Barriers to Rehab medically complex  -        Patient's Goals for Discharge return to PO diet  -                  Oral Motor Structure and Function    Dentition Assessment natural, present and adequate  -        Volitional Swallow WF  -        Volitional Cough [facility]  -                  Oral Musculature and Cranial Nerve Assessment    Oral Motor General Assessment WFL  -                  Clinical Swallow Eval    Clinical Swallow Evaluation Summary Patient seen for clinical swallow assessment.  ipad utilized for Kinyarwanda translation. Oral mech exam was remarkable for reduced intensity of voice. Occasional throat clearing appreciated, but did not correlate with a specific consistency. No overt s/s of aspiration with ice, thins via cup/straw, puree, mixed mech soft, or regular. No oral residue. Laryngeal elevation palpable and timely. Voice clear post swallow. SLP recs regular and thins. Meds as rebecca. Assist with meals. SLP to sign off.  -                  SLP Evaluation Clinical Impression    SLP Swallowing Diagnosis R/O pharyngeal dysphagia  -        Functional Impact --  -        Rehab Potential/Prognosis, Swallowing --  -        Swallow Criteria for Skilled Therapeutic Interventions Met --  -                  Recommendations    Therapy Frequency (Swallow) evaluation only  -        Predicted Duration Therapy Intervention (Days) --  -        SLP Diet Recommendation regular textures;thin liquids  -        Recommended Precautions and Strategies small bites of food and sips of liquid;upright posture during/after eating;assist with feeding  -        Oral Care  Recommendations Oral Care BID/PRN  -        SLP Rec. for Method of Medication Administration meds whole;as tolerated  -              User Key  (r) = Recorded By, (t) = Taken By, (c) = Cosigned By    Initials Name Effective Dates    Zulema Potter MS CCC-SLP 06/16/21 -                 EDUCATION  The patient has been educated in the following areas:   Dysphagia (Swallowing Impairment).                 Time Calculation:    Time Calculation- SLP     Row Name 08/03/22 1540             Time Calculation- SLP    SLP Start Time 1300  -      SLP Received On 08/03/22  -              Untimed Charges    [id number] Eval Oral Pharyng Swallow Minutes 60  -              Total Minutes    Untimed Charges Total Minutes 60  -       Total Minutes 60  -SH            User Key  (r) = Recorded By, (t) = Taken By, (c) = Cosigned By    Initials Name Provider Type    Zulema Potter MS CCC-SLP Speech and Language Pathologist                Therapy Charges for Today     Code Description Service Date Service Provider Modifiers Qty    [id number] HC ST EVAL ORAL PHARYNG SWALLOW 4 8/3/2022 Zulema Marin MS CCC-SLP GN 1                    Zulema Marin MS CCC-АННА  8/3/2022

## 2022-08-04 LAB
ALBUMIN SERPL-MCNC: 3.9 G/DL (ref 3.5–5.2)
ALBUMIN/GLOB SERPL: 1.3 G/DL
ALP SERPL-CCNC: 65 U/L (ref 39–117)
ALT SERPL W P-5'-P-CCNC: 29 U/L (ref 1–41)
ANION GAP SERPL CALCULATED.3IONS-SCNC: 11 MMOL/L (ref 5–15)
AST SERPL-CCNC: 38 U/L (ref 1–40)
BASOPHILS # BLD AUTO: 0.03 10*3/MM3 (ref 0–0.2)
BASOPHILS NFR BLD AUTO: 0.4 % (ref 0–1.5)
BILIRUB SERPL-MCNC: 0.3 MG/DL (ref 0–1.2)
BUN SERPL-MCNC: 7 MG/DL (ref 6–20)
BUN/CREAT SERPL: 9.9 (ref 7–25)
CALCIUM SPEC-SCNC: 9.3 MG/DL (ref 8.6–10.5)
CHLORIDE SERPL-SCNC: 105 MMOL/L (ref 98–107)
CO2 SERPL-SCNC: 25 MMOL/L (ref 22–29)
CREAT SERPL-MCNC: 0.71 MG/DL (ref 0.76–1.27)
DEPRECATED RDW RBC AUTO: 40.4 FL (ref 37–54)
EGFRCR SERPLBLD CKD-EPI 2021: 128.2 ML/MIN/1.73
EOSINOPHIL # BLD AUTO: 0.25 10*3/MM3 (ref 0–0.4)
EOSINOPHIL NFR BLD AUTO: 3.3 % (ref 0.3–6.2)
ERYTHROCYTE [DISTWIDTH] IN BLOOD BY AUTOMATED COUNT: 13.1 % (ref 12.3–15.4)
GLOBULIN UR ELPH-MCNC: 3.1 GM/DL
GLUCOSE BLDC GLUCOMTR-MCNC: 113 MG/DL (ref 70–130)
GLUCOSE BLDC GLUCOMTR-MCNC: 131 MG/DL (ref 70–130)
GLUCOSE BLDC GLUCOMTR-MCNC: 181 MG/DL (ref 70–130)
GLUCOSE SERPL-MCNC: 146 MG/DL (ref 65–99)
HCT VFR BLD AUTO: 45.2 % (ref 37.5–51)
HGB BLD-MCNC: 15.2 G/DL (ref 13–17.7)
IMM GRANULOCYTES # BLD AUTO: 0.02 10*3/MM3 (ref 0–0.05)
IMM GRANULOCYTES NFR BLD AUTO: 0.3 % (ref 0–0.5)
LYMPHOCYTES # BLD AUTO: 0.83 10*3/MM3 (ref 0.7–3.1)
LYMPHOCYTES NFR BLD AUTO: 11 % (ref 19.6–45.3)
MCH RBC QN AUTO: 28.5 PG (ref 26.6–33)
MCHC RBC AUTO-ENTMCNC: 33.6 G/DL (ref 31.5–35.7)
MCV RBC AUTO: 84.6 FL (ref 79–97)
MONOCYTES # BLD AUTO: 0.53 10*3/MM3 (ref 0.1–0.9)
MONOCYTES NFR BLD AUTO: 7 % (ref 5–12)
NEUTROPHILS NFR BLD AUTO: 5.87 10*3/MM3 (ref 1.7–7)
NEUTROPHILS NFR BLD AUTO: 78 % (ref 42.7–76)
NRBC BLD AUTO-RTO: 0 /100 WBC (ref 0–0.2)
PLATELET # BLD AUTO: 304 10*3/MM3 (ref 140–450)
PMV BLD AUTO: 10.3 FL (ref 6–12)
POTASSIUM SERPL-SCNC: 3.8 MMOL/L (ref 3.5–5.2)
PROT SERPL-MCNC: 7 G/DL (ref 6–8.5)
RBC # BLD AUTO: 5.34 10*6/MM3 (ref 4.14–5.8)
SODIUM SERPL-SCNC: 141 MMOL/L (ref 136–145)
WBC NRBC COR # BLD: 7.53 10*3/MM3 (ref 3.4–10.8)

## 2022-08-04 PROCEDURE — 90791 PSYCH DIAGNOSTIC EVALUATION: CPT

## 2022-08-04 PROCEDURE — 85025 COMPLETE CBC W/AUTO DIFF WBC: CPT | Performed by: INTERNAL MEDICINE

## 2022-08-04 PROCEDURE — 80053 COMPREHEN METABOLIC PANEL: CPT | Performed by: INTERNAL MEDICINE

## 2022-08-04 PROCEDURE — 82962 GLUCOSE BLOOD TEST: CPT

## 2022-08-04 RX ORDER — HYDROCODONE BITARTRATE AND ACETAMINOPHEN 5; 325 MG/1; MG/1
1 TABLET ORAL EVERY 6 HOURS PRN
Status: DISCONTINUED | OUTPATIENT
Start: 2022-08-04 | End: 2022-08-05

## 2022-08-04 RX ADMIN — Medication 10 ML: at 09:02

## 2022-08-04 RX ADMIN — Medication 10 ML: at 22:02

## 2022-08-04 NOTE — CONSULTS
"  Access center consult.    PPE including mask and eyewear worn throughout encounter. Appropriate hand hygiene performed before and after patient contact.     Met with patient in room #P478. Sitter at bedside.  [place] ID [id number]. Introduced self and role. Patient agreed to be evaluated.     Patient is a 27 y.o. S/H/M. He lives in an apartment with brother and cousin. He is alert and oriented x2(person, place), date he states \"July 2022.\" Taoist: Faith. Children: 2. Occupation: unemployed. Hobbies: denies. Education: primary. Legal: past incarceration. : Ronald Davies/brother-Speaks English [phone]. : denies. Support system: family. History of violence/trauma/abuse. denies. Pt. feels his home is a safe environment.    Access consulted for depression. Pt. presented to the ED via EMS with OD on hydrocodone. Tylenol level 99. Pt. admitted 7/30/22 with narcotic overdose. UDS +amphet/meth, benzo, oxycodone.    Pt. reports he had taken pain medication left over from his hand surgery in Jan. 2022. He reports taking OD due to many things, mainly family issues. Pt. did not elaborate on any other issues. He discussed the difficulty of losing his father 1 year ago, his mother is sick and 2 children all are living in [place].Pt. reports feeling overwhelmed and not knowing what else to do at the time. Pt. denies any past suicidal attempts. Pt. reports reason to live are family.     Pt. denies SI/HI/A/V/H. Denies wish to be dead.No recent changes in sleep or appetite. Depression: \"I barely feel depressed.\" Anxiety: denies.  He denies any past substance or mental health treatment. Pt. denies any meth/benzo/heroin use. He does admit to occasional ETOH and past THC and cocaine use socially with friends. Pt. denies having a problem with ETOH or illicit drug use.     Pt. willing to accept chemical dependency/mental health resources. Provided pt. with outpatient counseling resources " including [place] speaking. Provided chemical dependency resources inpatient/outpatient including NA support group.Dr. Shafer consulted.

## 2022-08-04 NOTE — PROGRESS NOTES
Discharge Planning Assessment  [facility]     Patient Name: Juventino ReyesMoreno  MRN: [id number]  Today's Date: 8/4/2022    Admit Date: 7/30/2022     Discharge Needs Assessment    No documentation.                Discharge Plan     Row Name 08/04/22 1420       Plan    Plan Comments The [facility] on 8/3/22. Dr. Shafer has asked [facility] to contact the access center when family is present so that collateral history can be obtained with regards to possible discharge home with outpatient chemical dependence follow-up versus inpatient psychiatric hospitalization if family feels as though the patient is currently unsafe.  CCP will continue to follow for any needs that may arise. ENRIQUETA Crocker RN, CCP.              Continued Care and Services - Admitted Since 7/30/2022    Coordination has not been started for this encounter.          Demographic Summary    No documentation.                Functional Status    No documentation.                Psychosocial    No documentation.                Abuse/Neglect    No documentation.                Legal    No documentation.                Substance Abuse    No documentation.                Patient Forms    No documentation.                   Tessy Crocker RN

## 2022-08-04 NOTE — PROGRESS NOTES
Dr. SUNITA Abreu    [address]    8/4/2022    Patient ID:  Name:  Juventino ReyesMoreno  MRN:  [id number]  1995  27 y.o.  male            CC/Reason for visit: drug over dose and polysubstance abuse    Interval hx: pt speaks Salvadorean. I spoke to him in Salvadorean. He has no new complaints. Wants to either go home tomorrow with parents or to inpatient psychiatric hospitalization, but understands he does not have health insurance.    ROS: no chest pain or abdominal pain    Vitals:  Vitals:    08/04/22 0209 08/04/22 0544 08/04/22 0633 08/04/22 1100   BP: 143/85 141/86  151/86   BP Location: Right arm Right arm  Right arm   Patient Position: Lying Lying  Lying   Pulse: 82 80  84   Resp: 18 18  18   Temp:  97.6 °F (36.4 °C)  97.6 °F (36.4 °C)   TempSrc:  Oral  Oral   SpO2: 100% 97%  98%   Weight:   79 kg (174 lb 2.6 oz)    Height:         FiO2 (%): 25 %     Body mass index is 26.48 kg/m².    Intake/Output Summary (Last 24 hours) at 8/4/2022 1625  Last data filed at 8/4/2022 0544  Gross per 24 hour   Intake 680 ml   Output --   Net 680 ml       Exam:  GEN:  No distress  Alert, oriented x 3.   Moves all 4 on commands, no deficits  Fluent speech  LUNGS: Clear breath sounds bilat, no use of accessory muscles  CV:  Normal S1S2, without murmur, no edema  ABD:  Non tender, no enlarged liver or masses      Scheduled meds:  insulin regular, 0-14 Units, Subcutaneous, TID With Meals  sodium chloride, 10 mL, Intravenous, Q12H      IV meds:                           Data Review:   I reviewed the patient's medications and new clinical results.    COVID19   Date Value Ref Range Status   07/30/2022 Not Detected Not Detected - Ref. Range Final         Lab Results   Component Value Date    CALCIUM 9.3 08/04/2022    MG 1.9 08/01/2022    MG 2.1 07/31/2022     Results from last 7 days   Lab Units 08/04/22  1129 08/03/22  0321 08/02/22  0355 08/01/22  1857 08/01/22  0337 07/31/22  1636   SODIUM mmol/L 141 140 136  --  140   --    POTASSIUM mmol/L 3.8 3.7 3.7   < > 3.4*  --    CHLORIDE mmol/L 105 104 103  --  107  --    CO2 mmol/L 25.0 25.6 26.0  --  23.0  --    BUN mg/dL 7 5* 7  --  5*  --    CREATININE mg/dL 0.71* 0.60* 0.59*  --  0.66*  --    CALCIUM mg/dL 9.3 8.6 8.3*  --  8.3*  --    BILIRUBIN mg/dL 0.3  --   --   --  0.6 0.8   ALK PHOS U/L 65  --   --   --  67 63   ALT (SGPT) U/L 29  --   --   --  15 17   AST (SGOT) U/L 38  --   --   --  16 17   GLUCOSE mg/dL 146* 152* 116*  --  98  --    WBC 10*3/mm3 7.53 7.32 7.51  --  9.36  --    HEMOGLOBIN g/dL 15.2 14.0 13.2  --  14.8  --    PLATELETS 10*3/mm3 304 135* 195  --  227  --    INR   --   --   --   --   --  1.32*    < > = values in this interval not displayed.                 Results from last 7 days   Lab Units 07/30/22  1914   PH, ARTERIAL pH units 7.343*   PCO2, ARTERIAL mm Hg 45.0   PO2 ART mm Hg 154.4*   MODALITY  Adult Vent   O2 SATURATION CALC % 99.2*       Estimated Creatinine Clearance: 173.1 mL/min (A) (by C-G formula based on SCr of 0.71 mg/dL (L)).      ASSESSMENT:     Narcotic overdose (HCC)  Opioid use disorder  methamphetamine use disorder   sedative-hypnotic use disorder  adjustment disorder   depressed mood  Need for mechanical ventilation, inability to protect airway due to drug overdose  Acute toxic encephalopathy, agitation  Acetaminophen overdose  Leukocytosis  Low potassium  Suicide attempt      PLAN:  Denies ongoing suicide thoughts.  Seen by psych  Continue w sitter and suicide precations  Pt wants to go home with parents or inpatient psych therapy. However does not have health insurance  Will have parents come in tomorrow and have psych speak with them via  to establish best course of action. May have to be DC home due to lack of inpatient options? Will discuss with case mgmt  Medically stable        Bandar Abreu MD  8/4/2022

## 2022-08-04 NOTE — PROGRESS NOTES
"Nutrition Services    Patient Name:  Juventino ReyesMoreno  YOB: 1995  MRN: [id number]  Admit Date:  7/30/2022    CLINICAL NUTRITION PROGRESS NOTE        Reason for Assessment Follow-up Protocol     Current Problems   8/4: Diet advanced to regular/thins after SLP eval on previous date. He has moved out of ICU to med/surg. Psych following d/t OD attempt.     8/3: Extubated today, awaiting SLP eval to start po diet.  Will follow po and need for nutrition supplements.     8/1: TF to start via NG. Isosource 1.5 @ 50 cc/hr goal       Encounter Information        Nutrition/Diet History:    See above.    Food Preferences:   n/a   Supplements:   n/a   Typical Activity Pattern:      Factors Affecting Intake: No factors at this time      Tests/Procedures: Clinical Swallow Evaluation 8/3       Anthropometrics        Current Height  Current Weight  BMI kg/m2 Height: 172.7 cm (68\")  Weight: 79 kg (174 lb 2.6 oz) (08/04/22 0633)  Body mass index is 26.48 kg/m².       Admission Weight 170 lb   Ideal Body Weight (IBW)    Usual Body Weight (UBW)        Trending Weight Hx  stable   Weight Change              PTA: Wt Readings from Last 30 Encounters:   08/04/22 0633 79 kg (174 lb 2.6 oz)   08/03/22 0327 77.4 kg (170 lb 10.2 oz)   08/02/22 0526 80.9 kg (178 lb 5.6 oz)   08/01/22 0400 67.9 kg (149 lb 11.1 oz)   07/31/22 0800 77.4 kg (170 lb 10.2 oz)   07/30/22 2045 77.4 kg (170 lb 10.2 oz)   07/30/22 1733 79.4 kg (175 lb)            Labs       Pertinent Labs    Results from last 7 days   Lab Units 08/04/22  1129 08/03/22  0321 08/02/22  0355 08/01/22  1857 08/01/22  0337 07/31/22  1636   SODIUM mmol/L 141 140 136  --  140  --    POTASSIUM mmol/L 3.8 3.7 3.7   < > 3.4*  --    CHLORIDE mmol/L 105 104 103  --  107  --    CO2 mmol/L 25.0 25.6 26.0  --  23.0  --    BUN mg/dL 7 5* 7  --  5*  --    CREATININE mg/dL 0.71* 0.60* 0.59*  --  0.66*  --    CALCIUM mg/dL 9.3 8.6 8.3*  --  8.3*  --    BILIRUBIN mg/dL 0.3  --   --   --  " "0.6 0.8   ALK PHOS U/L 65  --   --   --  67 63   ALT (SGPT) U/L 29  --   --   --  15 17   AST (SGOT) U/L 38  --   --   --  16 17   GLUCOSE mg/dL 146* 152* 116*  --  98  --     < > = values in this interval not displayed.     Results from last 7 days   Lab Units 08/04/22  1129 08/02/22  0355 08/01/22  0337 07/31/22  0318   MAGNESIUM mg/dL  --   --  1.9 2.1   HEMOGLOBIN g/dL 15.2   < > 14.8 15.7   HEMATOCRIT % 45.2   < > 43.7 46.7   WBC 10*3/mm3 7.53   < > 9.36 9.48    < > = values in this interval not displayed.     Results from last 7 days   Lab Units 08/04/22  1129 08/03/22  0321 08/02/22  0355 08/01/22  0337 07/31/22  1636 07/31/22  0318   INR   --   --   --   --  1.32*  --    PLATELETS 10*3/mm3 304 135* 195 227  --  243     COVID19   Date Value Ref Range Status   07/30/2022 Not Detected Not Detected - Ref. Range Final     No results found for: HGBA1C       Medications           Scheduled Medications insulin regular, 0-14 Units, Subcutaneous, TID With Meals  sodium chloride, 10 mL, Intravenous, Q12H       Infusions     PRN Medications •  ALPRAZolam  •  dextrose  •  dextrose  •  glucagon (human recombinant)  •  hydrALAZINE  •  HYDROcodone-acetaminophen  •  magnesium sulfate **OR** magnesium sulfate **OR** magnesium sulfate  •  potassium & sodium phosphates **OR** potassium & sodium phosphates  •  potassium chloride **OR** potassium chloride **OR** potassium chloride  •  [COMPLETED] Insert peripheral IV **AND** sodium chloride  •  sodium chloride     Physical Findings        Physical Appearance alert     NFPE Not applicable   --  Edema  no edema   Gastrointestinal non-distended , last bowel movement:8/3   Tubes/Drains none   Oral/Mouth Cavity missing teeth   Skin skin intact     Estimated/Assessed Needs       Energy Requirements    Height for Calculation  Height: 172.7 cm (68\")   Weight for Calculation 67.9 kg   Method for Estimation  30 kcal/kg   EST Needs (kcal/day) 2037       Protein Requirements    Weight for " Calculation 67.9 kg   EST Protein Needs (g/kg) 1.2 gm/kg   EST Daily Needs (g/day) 81       Fluid Requirements     Estimated Needs (mL/day)        Fluid Deficit    Current Na Level (mEq/L)    Desired Na Level (mEq/L)    Estimated Fluid Deficit (L)     --  Current Nutrition Orders & Evaluation of Intake       Oral Nutrition     Food Allergies NKFA   Current PO Diet Diet Regular   Supplement n/a   PO Evaluation     Trending % PO Intake UTD - no data in graphics       --  Nutrition Diagnosis        Nutrition Dx Problem 1 Problem: Needs Alternative Route    Etiology: Medical Diagnosis    Signs/Symptoms: NPO    Comment:   8/4: Problem resolved - diet advanced to reg/thins         Intervention Goal        Intervention Goal(s) Maintain nutrition status, Establish PO intake, Tolerate PO  and PO intake goal %: 75     Nutrition Intervention        RD Action Follow Tx Progress, Care plan reviewed and Recommend/ordered:      Nutrition Prescription        Diet Prescription    Supplement Prescription n/a   Prescription Ordered      -  Monitor/Evaluation        Monitor Per protocol, PO intake, Pertinent labs, Skin status, Symptoms, Swallow function     RD to follow per protocol.      Electronically signed by:  Nicole Davison RD  08/04/22 13:15 EDT

## 2022-08-04 NOTE — NURSING NOTE
Updated Johana RN with poison control center. Per RN his case will be closed d/t improvement in medical state.

## 2022-08-04 NOTE — NURSING NOTE
"  Access center follow up.  Services Danny ID #[id number]. Aj ID #[id number].    Primary RN reporting pts. brother here visiting and primary nurse discussed d/c recommendation per Psychiatrist. This writer spoke with other brother Ronald by phone who could not confirm if he feels pt. will be safe at home stating \"I'm at work, I told them I would return to the hospital in the morning.\" Ronald pts. brother unavailable at this time to discuss pt. d/c options.    Asked other brother in room with pt. if he felt pt. safe to be possibly d/c'ed home with mental health resources to f/u with. In addition, informed brother if he feels pt. unsafe to return home, inpatient psychiatric care will be initiated once medically clear. Pts. brother unable to confirm if he feels pt. safe to go home at this time, stating \"I work and would have to talk with my other brother(Ronald) later on tonight, and let you all know tomorrow, we both work.\"     Notified Dr. Shafer who advised Access to initiate finding bed placement for inpatient psychiatric hospitalization once medically cleared.  Informed pt. and brother who v/u and are in agreement. Pt. willing to voluntary admit self for inpatient mental health treatment.  Pt. states \"I know it's for my own wellbeing and my family.\" Pt. discussed wanting to get better to be with expectant s/o and his mother who is sick. Notified primary RN of Psychiatrist d/c plan. Access following.                 "

## 2022-08-04 NOTE — CONSULTS
IDENTIFYING INFORMATION: The patient is a 27-year-old  male admitted following a hydrocodone ingestion    CHIEF COMPLAINT: None given    INFORMANT: Patient and chart    RELIABILITY: Limited    HISTORY OF PRESENT ILLNESS: The patient is a 19-year-old  male admitted following an ingestion of hydrocodone.  The patient reports that this was a suicide attempt as he had quarreled with his wife previous to his ingestion.  He now expresses contrition over the events leading to hospitalization and states that he has recently learned that his wife is pregnant and he is excited about this opportunity.  Patient is currently unemployed having previously worked in construction.  His drug screen was also positive for methamphetamine benzodiazepine and oxycodone though the patient denies abuse of any substances.  He denies prior psychiatric contact and denies current suicidal or homicidal ideation.  He denies recent changes in sleep or appetite.    PAST PSYCHIATRIC HISTORY: None reported    PAST MEDICAL HISTORY: None reported    MEDICATIONS:   Current Facility-Administered Medications   Medication Dose Route Frequency Provider Last Rate Last Admin   • ALPRAZolam (XANAX) tablet 0.5 mg  0.5 mg Oral BID PRN Bandar Abreu MD   0.5 mg at 08/03/22 2124   • dextrose (D50W) (25 g/50 mL) IV injection 25 g  25 g Intravenous Q15 Min PRN Bandar Abreu MD       • dextrose (GLUTOSE) oral gel 15 g  15 g Oral Q15 Min PRN Bandar Abreu MD       • glucagon (human recombinant) (GLUCAGEN DIAGNOSTIC) injection 1 mg  1 mg Intramuscular Q15 Min PRN Bandar Abreu MD       • hydrALAZINE (APRESOLINE) injection 20 mg  20 mg Intravenous Q6H PRN Bandar Abreu MD       • HYDROcodone-acetaminophen (NORCO) 5-325 MG per tablet 1 tablet  1 tablet Oral Q4H PRN Bandar Abreu MD       • insulin regular (humuLIN R,novoLIN R) injection 0-14 Units  0-14 Units Subcutaneous TID With Meals Bandar Abreu MD       • Magnesium Sulfate 2  gram Bolus, followed by 8 gram infusion (total Mg dose 10 grams)- Mg less than or equal to 1mg/dL  2 g Intravenous PRBandar Mullins MD        Or   • Magnesium Sulfate 2 gram / 50mL Infusion (GIVE X 3 BAGS TO EQUAL 6GM TOTAL DOSE) - Mg 1.1 - 1.5 mg/dl  2 g Intravenous PRBandar Mullins MD        Or   • Magnesium Sulfate 4 gram infusion- Mg 1.6-1.9 mg/dL  4 g Intravenous PRBandar Mullins MD       • potassium & sodium phosphates (PHOS-NAK) 280-160-250 MG packet - for Phosphorus less than 1.25 mg/dL  2 packet Oral Q6H PRN Bandar Abreu MD        Or   • potassium & sodium phosphates (PHOS-NAK) 280-160-250 MG packet - for Phosphorus 1.25 - 2.5 mg/dL  2 packet Oral Q6H PRBandar Mullins MD       • potassium chloride (K-DUR,KLOR-CON) ER tablet 40 mEq  40 mEq Oral PRN Bandar Abreu MD        Or   • potassium chloride (KLOR-CON) packet 40 mEq  40 mEq Oral PRBandar Mullins MD        Or   • potassium chloride 10 mEq in 100 mL IVPB  10 mEq Intravenous Q1H PRBandar Mullins  mL/hr at 08/01/22 1245 10 mEq at 08/01/22 1245   • sodium chloride 0.9 % flush 10 mL  10 mL Intravenous PRN Bandar Abreu MD       • sodium chloride 0.9 % flush 10 mL  10 mL Intravenous Q12H Bandar Abreu MD   10 mL at 08/04/22 0902   • sodium chloride 0.9 % flush 10 mL  10 mL Intravenous PRN Bandar Abreu MD             ALLERGIES: None    FAMILY HISTORY: None    SOCIAL HISTORY: The patient is presently unemployed.  He had previously worked in construction.  Substance use is as noted previously he has a son in [place].    MENTAL STATUS EXAM: The patient is a well-developed well-nourished  male appearing his stated age.  He is seen with the assistance of the .  He is awake alert and oriented seers.  His mood is euthymic his affect congruent.  Speech is generally relevant and coherent.  The patient does not report current suicidal or homicidal ideation or psychotic features.  Judgement and insight  appear to be intact.  No signs of withdrawal noted.    ASSETS/LIABILITIES: To be assessed    DIAGNOSTIC IMPRESSION: Opioid use disorder, methamphetamine use disorder, sedative-hypnotic use disorder, adjustment disorder depressed mood, status post oxycodone ingestion    PLAN: The patient is currently denying suicidal ideation and expresses future orientation having recently learned of his wife's pregnancy.  Family, however, has expressed concern regarding the patient's safety and the circumstances of his overdose or are troublesome as is his abuse of other illicit substances.  Accordingly, I will for now recommend continuation of suicide precautions.  Staff reports that family has visited frequently.  I have instructed staff to contact the access center when family is present so that collateral history can be obtained with regards to possible discharge home with outpatient chemical dependence follow-up versus inpatient psychiatric hospitalization if family feels as though the patient is currently unsafe.  Thank you for the opportunity to this patient.

## 2022-08-04 NOTE — PLAN OF CARE
Problem: Adult Inpatient Plan of Care  Goal: Plan of Care Review  Outcome: Ongoing, Progressing  Flowsheets (Taken 8/4/2022 4021)  Progress: improving  Outcome Evaluation: Patient denies any needs. Interpretor iPad used by staff to communicate with patient/family. Access and psychiatry following for discharge planning/safety measures. Patient cooperative with care and is appropriate with staff. Will continue to monitor.

## 2022-08-04 NOTE — PLAN OF CARE
Problem: Adult Inpatient Plan of Care  Goal: Patient-Specific Goal (Individualized)  Outcome: Ongoing, Progressing  Flowsheets (Taken 8/4/2022 0351)  Patient-Specific Goals (Include Timeframe):  • Suicidal precautions  •  at [facility]  Individualized Care Needs: Suicidal precautions  Anxieties, Fears or Concerns: Suicidal thoughts   Goal Outcome Evaluation:  Plan of Care Reviewed With: patient, family        Progress: improving  Outcome Evaluation: Patient alert and oriented. Vitals stable. Suicidal precautions continued-  at [facility]. Pt reported in assessment he is still suicidal. Patient very anxious at beginning of shift - RN medicated with xanax and patient has relaxed as shift has progressed. Pt turning self in bed, up with assist. Updated brother on visitation policy. Intepreter utilized for assessment at beginning of shift. Pt has no complaints. Will continue to monitor for safety and discomfort.

## 2022-08-05 VITALS
OXYGEN SATURATION: 96 % | RESPIRATION RATE: 18 BRPM | WEIGHT: 174.16 LBS | HEART RATE: 80 BPM | DIASTOLIC BLOOD PRESSURE: 93 MMHG | TEMPERATURE: 98.3 F | SYSTOLIC BLOOD PRESSURE: 156 MMHG | BODY MASS INDEX: 26.4 KG/M2 | HEIGHT: 68 IN

## 2022-08-05 LAB
BASOPHILS # BLD AUTO: 0.03 10*3/MM3 (ref 0–0.2)
BASOPHILS NFR BLD AUTO: 0.4 % (ref 0–1.5)
DEPRECATED RDW RBC AUTO: 41.7 FL (ref 37–54)
EOSINOPHIL # BLD AUTO: 0.44 10*3/MM3 (ref 0–0.4)
EOSINOPHIL NFR BLD AUTO: 5.3 % (ref 0.3–6.2)
ERYTHROCYTE [DISTWIDTH] IN BLOOD BY AUTOMATED COUNT: 13.3 % (ref 12.3–15.4)
HCT VFR BLD AUTO: 47.7 % (ref 37.5–51)
HGB BLD-MCNC: 15.9 G/DL (ref 13–17.7)
IMM GRANULOCYTES # BLD AUTO: 0.03 10*3/MM3 (ref 0–0.05)
IMM GRANULOCYTES NFR BLD AUTO: 0.4 % (ref 0–0.5)
LYMPHOCYTES # BLD AUTO: 1.56 10*3/MM3 (ref 0.7–3.1)
LYMPHOCYTES NFR BLD AUTO: 18.9 % (ref 19.6–45.3)
MCH RBC QN AUTO: 28.6 PG (ref 26.6–33)
MCHC RBC AUTO-ENTMCNC: 33.3 G/DL (ref 31.5–35.7)
MCV RBC AUTO: 85.8 FL (ref 79–97)
MONOCYTES # BLD AUTO: 0.63 10*3/MM3 (ref 0.1–0.9)
MONOCYTES NFR BLD AUTO: 7.6 % (ref 5–12)
NEUTROPHILS NFR BLD AUTO: 5.56 10*3/MM3 (ref 1.7–7)
NEUTROPHILS NFR BLD AUTO: 67.4 % (ref 42.7–76)
NRBC BLD AUTO-RTO: 0 /100 WBC (ref 0–0.2)
PLATELET # BLD AUTO: 337 10*3/MM3 (ref 140–450)
PMV BLD AUTO: 10.3 FL (ref 6–12)
RBC # BLD AUTO: 5.56 10*6/MM3 (ref 4.14–5.8)
WBC NRBC COR # BLD: 8.25 10*3/MM3 (ref 3.4–10.8)

## 2022-08-05 PROCEDURE — 85025 COMPLETE CBC W/AUTO DIFF WBC: CPT | Performed by: INTERNAL MEDICINE

## 2022-08-05 RX ADMIN — ALPRAZOLAM 0.5 MG: 0.5 TABLET ORAL at 00:55

## 2022-08-05 NOTE — PROGRESS NOTES
The patient is sleeping soundly today and offers no new complaints.  We have not yet received definitive word from family regarding their concerns over the patient's safety and accordingly suicide precautions and inpatient hospitalization should continue until we have more definitive word from family regarding possible need for inpatient psychiatric care.  The [facility] center is continuing to pursue this.

## 2022-08-05 NOTE — NURSING NOTE
[facility] follow-up.  Spoke with patient via interpretor Ravin ID# [id number].  Patient reports feeling much better.  He denies SI citing his family, mom, wife, and son.  He denies anxiety and depression.  He reports he will be able to return home with his brothers Ronald and Dion as they have agreed to help him.  Spoke with Ronald via phone he reports wanting to take patient home vs. IP psych admission and feels safe to do so.  Informed Dr. Shafer who stated patient can be discharged home.  Patient was previously provided with outpatient mental health resources.  RN informed.

## 2022-08-05 NOTE — DISCHARGE SUMMARY
Patient Identification:  Name: Juventino ReyesMoreno  Age: 27 y.o.  Sex: male  :  1995  MRN: [id number]  Primary Care Physician: Provider, No Known    Admit date: 2022  Discharge date and time: 2022  Discharged Condition: good    Discharge Diagnoses:   Narcotic overdose (HCC)  Opioid use disorder  methamphetamine use disorder   sedative-hypnotic use disorder  adjustment disorder   depressed mood  Need for mechanical ventilation, inability to protect airway due to drug overdose  Acute toxic encephalopathy, agitation  Acetaminophen overdose  Leukocytosis  Low potassium  Suicide attempt suspected      Hospital Course: Juventino ReyesMoreno presented to [facility] with narcotic overdose.  It was unclear whether he had tried suicide.  He was unable to protect his airway so he was intubated and provided with mechanical ventilation.  He was admitted to the intensive care unit where he received mechanical ventilation.  Initially had significant encephalopathy due to the medications and drugs that he took.  He was quite agitated and after titrating down some of his narcotics and benzodiazepines he was able to be extubated from mechanical ventilation.  Over the ensuing days he did quite well.  I spoke Tuvaluan to the patient and we used interpreters as needed with additional staff.  Over the ensuing days he continues to improve and we continue to taper down his narcotics and benzodiazepines as he was seen by mental health/psychiatry specialist.  Today during interview with mental health nurse practitioner the patient states he has no suicidal thoughts or desires.  He says he is motivated to live because of his wife and children.  He does not feel depressed or sad or anxious.  He thinks he will do well at home and does not want inpatient therapy.  All of this was documented by psychiatry nurse practitioner as well as  and the patient's brother.  They have stated from the  mental health standpoint that the patient is adequate for discharge home today.  He was given information for substance abuse health and mental health help.  He was advised not to use illicit substances or recreational drugs.  He is being discharged home today.      Consults:   IP CONSULT TO PULMONOLOGY  IP CONSULT TO ACCESS CENTER  IP CONSULT TO PSYCHIATRIST    Significant Diagnostic Studies:   Imaging Results (Most Recent)     Procedure Component Value Units Date/Time    XR Abdomen KUB [[id number]] Collected: 08/01/22 1710     Updated: 08/01/22 1714    Narrative:      XR ABDOMEN KUB-     Clinical: Coretrak catheter placement     FINDINGS: Coretrak catheter is looped within the gastric body. This  position is satisfactory. Nonobstructive bowel gas pattern within the  upper abdomen. Soft tissue planes preserved.     CONCLUSION: Coretrak is looped within the gastric body.     This report was finalized on 8/1/2022 5:11 PM by Dr. Sushant Posey M.D.       CT Head Without Contrast [[id number]] Collected: 07/30/22 2030     Updated: 07/31/22 1207    Narrative:      CT HEAD WITHOUT CONTRAST     HISTORY: Altered mental status.     COMPARISON: None.     FINDINGS: The brain ventricles are symmetrical. There is no evidence of  hemorrhage, hydrocephalus or of abnormal extra-axial fluid. No focal  area of decreased attenuation to suggest acute infarction is identified.  The gray-white demarcation is maintained. A piercing is noted involving  the supraorbital region to the left laterally.       Impression:      No evidence of acute infarction, hemorrhage or of abnormal  extra-axial fluid. The gray-white demarcation is maintained. Further  evaluation could be performed with an MRI examination of the brain  (after removal of a piercing involving the supraorbital region on the  left laterally) as indicated.        Radiation dose reduction techniques were utilized, including automated  exposure control and exposure modulation based on  body size.     This report was finalized on 7/31/2022 12:04 PM by Dr. Yoni Pang M.D.       XR Chest 1 View [[id number]] Collected: 07/30/22 1912     Updated: 07/30/22 2102    Narrative:      PORTABLE CHEST      HISTORY: Status post intubation.     FINDINGS:  A single portable view of the chest demonstrates the heart to  be within normal limits in size. There is no evidence of infiltrate,  effusion, congestive failure or of pneumothorax. An endotracheal tube is  in place in satisfactory position with the tip at the level of the  thoracic inlet.     This report was finalized on 7/30/2022 8:59 PM by Dr. Yoni Pang M.D.                   TEST  RESULTS PENDING AT DISCHARGE      Discharge Exam:  Alert and oriented x 4, in NAD  Supple neck, midline trach  RRR, no m/r/g, no edema  Clear bilaterally, no wheezing, nonlabored  No clubbing or cyanosis     Disposition:  Home    Patient Instructions:      Discharge Medications      Patient Not Prescribed Medications Upon Discharge          Your medication list      You have not been prescribed any medications.             Medication Reconciliation: Please see electronically completed Med Rec.    Total time spent discharging patient including evaluation, medication reconciliation, arranging follow up, and post hospitalization instructions and education to patient and family total time exceeds 30 minutes.    Signed:  Bandar Abreu MD  8/5/2022  14:19 EDT

## 2022-08-05 NOTE — PROGRESS NOTES
Case Management Discharge Note      Final Note: Discharged to home with brothers and they will provide the transportation. ENRIQUETA Crocker Rn, CCP    Provided Post Acute Provider List?: N/A    Selected Continued Care - Admitted Since 7/30/2022     Destination    No services have been selected for the patient.              Durable Medical Equipment    No services have been selected for the patient.              Dialysis/Infusion    No services have been selected for the patient.              Home Medical Care    No services have been selected for the patient.              Therapy    No services have been selected for the patient.              Community Resources    No services have been selected for the patient.              Community & DME    No services have been selected for the patient.                  Transportation Services  Private: Car    Final Discharge Disposition Code: 01 - home or self-care

## 2022-08-05 NOTE — PLAN OF CARE
Problem: Adult Inpatient Plan of Care  Goal: Patient-Specific Goal (Individualized)  Outcome: Ongoing, Progressing  Flowsheets (Taken 8/4/2022 0351)  Patient-Specific Goals (Include Timeframe):   Suicidal precautions    at bsd  Individualized Care Needs: Suicidal precautions  Anxieties, Fears or Concerns: Suicidal thoughts   Goal Outcome Evaluation:  Plan of Care Reviewed With: patient, family        Progress: improving  Outcome Evaluation: Patient alert and oriented. Patient appears to be in better spirits this night shift. Family at bsd for a few hours. Cell phone approved by MD to stay with patient. Suicide precautions continued and sitter at d. PRN xanax given this night shift. Will continue to monitor for discomfort and safety concerns.

## 2022-09-25 ENCOUNTER — HOSPITAL ENCOUNTER (EMERGENCY)
Facility: HOSPITAL | Age: 27
Discharge: HOME | End: 2022-09-25
Attending: EMERGENCY MEDICINE | Admitting: EMERGENCY MEDICINE

## 2022-09-25 VITALS
OXYGEN SATURATION: 100 % | WEIGHT: 175 LBS | HEART RATE: 74 BPM | DIASTOLIC BLOOD PRESSURE: 95 MMHG | RESPIRATION RATE: 18 BRPM | BODY MASS INDEX: 26.52 KG/M2 | HEIGHT: 68 IN | SYSTOLIC BLOOD PRESSURE: 134 MMHG | TEMPERATURE: 97.4 F

## 2022-09-25 DIAGNOSIS — J02.9 SORE THROAT: Primary | ICD-10-CM

## 2022-09-25 LAB
HETEROPH AB SER QL LA: NEGATIVE
S PYO AG THROAT QL: NEGATIVE

## 2022-09-25 PROCEDURE — 99283 EMERGENCY DEPT VISIT LOW MDM: CPT

## 2022-09-25 PROCEDURE — 87880 STREP A ASSAY W/OPTIC: CPT | Performed by: EMERGENCY MEDICINE

## 2022-09-25 PROCEDURE — 36415 COLL VENOUS BLD VENIPUNCTURE: CPT

## 2022-09-25 PROCEDURE — 86308 HETEROPHILE ANTIBODY SCREEN: CPT | Performed by: EMERGENCY MEDICINE

## 2022-09-25 PROCEDURE — 87081 CULTURE SCREEN ONLY: CPT | Performed by: EMERGENCY MEDICINE

## 2022-09-25 RX ORDER — BENZONATATE 200 MG/1
200 CAPSULE ORAL 3 TIMES DAILY PRN
Qty: 21 CAPSULE | Refills: 0 | Status: SHIPPED | OUTPATIENT
Start: 2022-09-25

## 2022-09-25 NOTE — ED TRIAGE NOTES
PT states that he has had sore throat for the last month. States that he feels like something is scratching it. Pt speaks Pashto and an  was used.     Patient masked at arrival and triage staff wore all appropriate PPE during entire encounter with patient.

## 2022-09-25 NOTE — ED PROVIDER NOTES
EMERGENCY DEPARTMENT ENCOUNTER  I wore full protective equipment throughout this patient encounter including a N95 mask, eye shield, gown and gloves. Hand hygiene was performed before donning protective equipment and after removal when leaving the room.    Room Number:  38/38  Date of encounter:  9/25/2022  PCP: Provider, No Known    HPI:  Context: Juventino ReyesMoreno is a 27 y.o. male who presents to the ED c/o chief complaint of sore throat.  Patient reports that last time he was in the hospital approximately a month ago he had a to put down his throat, has sore throat afterwards.  Patient reports that resolved but then he began having a sore throat approximately a week ago.  Patient complains of pain with swallowing.  Patient reports he has been able eat and drink, denies any voice change.  Patient has been coughing, cough is nonproductive.  Patient denies any runny nose or congestion, denies any fever shakes chills or night sweats.    MEDICAL HISTORY REVIEW  Reviewed in EPIC    PAST MEDICAL HISTORY  Active Ambulatory Problems     Diagnosis Date Noted   • Narcotic overdose (HCC) 07/30/2022     Resolved Ambulatory Problems     Diagnosis Date Noted   • No Resolved Ambulatory Problems     No Additional Past Medical History       PAST SURGICAL HISTORY  History reviewed. No pertinent surgical history.    FAMILY HISTORY  History reviewed. No pertinent family history.    SOCIAL HISTORY  Social History     Socioeconomic History   • Marital status:    Tobacco Use   • Smoking status: Never Smoker   • Smokeless tobacco: Never Used   Vaping Use   • Vaping Use: Never used   Substance and Sexual Activity   • Alcohol use: Not Currently   • Drug use: Not Currently   • Sexual activity: Defer       ALLERGIES  Patient has no known allergies.    The patient's allergies have been reviewed    REVIEW OF SYSTEMS  All systems reviewed and negative except for those discussed in HPI.     PHYSICAL EXAM  I have reviewed the triage  vital signs and nursing notes.  ED Triage Vitals [09/25/22 1207]   Temp Heart Rate Resp BP SpO2   97.4 °F (36.3 °C) 94 16 -- 97 %      Temp src Heart Rate Source Patient Position BP Location FiO2 (%)   Tympanic Monitor -- -- --       General: No acute distress.  HENT: NCAT, PERRL, Nares patent.  Oropharynx is clear, no oropharyngeal erythema or swelling, no exudates.  Uvula is midline, no uvular crowding.  Voice is normal.  Eyes: no scleral icterus.  Neck: trachea midline, neck supple, no ROM limitations.  Positive tender anterior cervical lymphadenopathy.  CV: regular rhythm, regular rate.  Respiratory: normal effort, CTAB.  Abdomen: soft, nondistended, NTTP, no rebound tenderness, no guarding or rigidity.  Musculoskeletal: no deformity.  Neuro: alert, moves all extremities, follows commands.  Skin: warm, dry.    LAB RESULTS  Recent Results (from the past 24 hour(s))   Mononucleosis Screen    Collection Time: 09/25/22 12:58 PM    Specimen: Blood   Result Value Ref Range    Monospot Negative Negative   Rapid Strep A Screen - Swab, Throat    Collection Time: 09/25/22  1:15 PM    Specimen: Throat; Swab   Result Value Ref Range    Strep A Ag Negative Negative       I ordered the above labs and reviewed the results.    RADIOLOGY  No Radiology Exams Resulted Within Past 24 Hours    I ordered the above noted radiological studies. I reviewed the images and results. I agree with the radiologist interpretation.    PROCEDURES  Procedures    MEDICATIONS GIVEN IN ER  Medications - No data to display    PROGRESS, DATA ANALYSIS, CONSULTS, AND MEDICAL DECISION MAKING  A complete history and physical exam have been performed.  All available laboratory and imaging results have been reviewed by myself prior to disposition.    MDM  After the initial H&P, I discussed pertinent information from history and physical exam with patient/family.  Discussed differential diagnosis.  Discussed plan for ED evaluation/workup/treatment.  All  questions answered.  Patient/family is agreeable with plan.       AS OF 14:52 EDT VITALS:    BP - 135/87  HR - 82  TEMP - 97.4 °F (36.3 °C) (Tympanic)  O2 SATS - 98%    DIAGNOSIS  Final diagnoses:   Sore throat         DISPOSITION  DISCHARGE    Patient discharged in stable condition.    Reviewed implications of results, diagnosis, meds, responsibility to follow up, warning signs and symptoms of possible worsening, potential complications and reasons to return to ER.    Patient/Family voiced understanding of above instructions.    Discussed plan for discharge, as there is no emergent indication for admission. Patient referred to primary care provider for BP management due to today's BP. Pt/family is agreeable and understands need for follow up and repeat testing.  Pt is aware that discharge does not mean that nothing is wrong but it indicates no emergency is present that requires admission and they must continue care with follow-up as given below or physician of their choice.     FOLLOW-UP  Your PCP    Schedule an appointment as soon as possible for a visit in 2 days  even if well    [facility] - [place] [address]  [phone]    if you are unable to follow up with your PCP    Cole Ernst MD  [address] [place] [address]  [phone]    Schedule an appointment as soon as possible for a visit   as needed         Medication List      New Prescriptions    benzocaine-menthol 6-10 MG lozenge  Commonly known as: CHLORASEPTIC  Take 1 lozenge by mouth Every 2 (Two) Hours As Needed for Sore Throat.     benzonatate 200 MG capsule  Commonly known as: TESSALON  Take 1 capsule by mouth 3 (Three) Times a Day As Needed for Cough.           Where to Get Your Medications      You can get these medications from any pharmacy    Bring a paper prescription for each of these medications  · benzocaine-menthol 6-10 MG lozenge  · benzonatate 200 MG capsule          Brock Kapadia,  MD  09/25/22 1455

## 2022-09-27 LAB — BACTERIA SPEC AEROBE CULT: NORMAL

## 2022-12-22 NOTE — NURSING NOTE
Patient has slept majority of shift. Sitter at bedside. Patient was talking with family frequently on the phone earlier in shift.     Admitted for narcotic overdose.     Plan is to discharge patient home with outpatient [facility] resources vs inpatient psychiatric hospitalization if family feels currently unsafe. Access will need to speak with family later today.    [Appropriately responsive] : appropriately responsive [Alert] : alert [No Acute Distress] : no acute distress [Oriented x3] : oriented x3
